# Patient Record
Sex: FEMALE | Race: WHITE | NOT HISPANIC OR LATINO | Employment: OTHER | URBAN - METROPOLITAN AREA
[De-identification: names, ages, dates, MRNs, and addresses within clinical notes are randomized per-mention and may not be internally consistent; named-entity substitution may affect disease eponyms.]

---

## 2018-10-02 ENCOUNTER — TELEPHONE (OUTPATIENT)
Dept: PAIN MEDICINE | Facility: CLINIC | Age: 58
End: 2018-10-02

## 2018-10-02 NOTE — TELEPHONE ENCOUNTER
Left message for Kerline, informed her that we do not currently accept patient's insurance (Medicaid).      ----- Message from Sita Soto sent at 10/2/2018  4:03 PM CDT -----  Contact: Kerline [facility]  Calling to check the status of the referral that was faxed 9/25/18. Please call [phone]

## 2018-10-22 ENCOUNTER — CLINICAL SUPPORT (OUTPATIENT)
Dept: REHABILITATION | Facility: HOSPITAL | Age: 58
End: 2018-10-22
Payer: MEDICAID

## 2018-10-22 DIAGNOSIS — M54.9 BACK PAIN: Primary | ICD-10-CM

## 2018-10-22 DIAGNOSIS — M62.81 MUSCLE WEAKNESS (GENERALIZED): ICD-10-CM

## 2018-10-22 DIAGNOSIS — M54.30 SCIATICA: ICD-10-CM

## 2018-10-22 PROCEDURE — 97161 PT EVAL LOW COMPLEX 20 MIN: CPT

## 2018-10-22 NOTE — PLAN OF CARE
Physical Therapy Evaluation / Plan of Care    Name: Karis SANDHU [facility]  Clinic Number: [id number]    Diagnosis:   Encounter Diagnosis   Name Primary?    Muscle weakness (generalized)      Physician: Rehan Whitaker MD  Treatment Orders: PT Eval and Treat    No past medical history on file.  No current outpatient medications on file.     No current facility-administered medications for this visit.      Review of patient's allergies indicates:  Allergies not on file  Precautions: recent chemo and radiation secondary to breast cancer    Evaluation Date: 10/22/18  Visit # authorized: 8  Authorization period: n/a  Plan of care Expiration: 01/17/19    Subjective   Onset/ANGLELA: gradual and chronic  Onset Date: chronic  Prior Level of Function: independent ADL's and IADL's  Current level of Function: pain limiting ADL's and functional mobility  Social History: patient lives alone in a RV park with a few steep steps to enter trailer, no handrail  Med History: breast cancer s/p right lumpectomy followed by radiation and chemo, currently smokes a pack of cigarettes per day, 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 female that presents to [facility] secondary to low back and right hip pain. Karis states she has had back pain for over 10 years, insidious onset for which she has had no previous medical treatment for. She has recently completed chemo and radiation treatments following diagnosis of breast cancer. During this time her oncologist referred her to neurosurgery as well as to pain management. She is, in fact, scheduled for a pain management procedure with Dr Graham this week but she is unsure of the specifics of planned procedure.      Imaging:  MRI taken but results not available in Epic.    Pain: current 7/10, worst 10/10, best 7/10, Sharp and Shooting  right low back into ant thigh to knee; dull aching in mid back, constant  Radicular symptoms: RLE, ant thigh to knee  Aggravating factors: standing, walking  Easing factors: ice helps temporarily, rest    Pts goals: get back to working full time    No cultural, environmental, or spiritual barriers identified to treatment or learning.  Objective   Observation: Patient is a well developed, well nourished female in NAD.  Posture:  Forward head, rounded shoulders, increased kyphosis, decreased lumbar lordosis    Lumbar Range of Motion:    Movement Loss Pain   Flexion Minimal    Mid-end range pain        Extension Moderate    End range discomfort        Left Side Bending Minimal Increase pain        Right Side Bending Minimal  End range discomfort        Left rotation   Minimal  No change        Right Rotation   Minimal  No change           Lower Extremity Strength  Right LE  Left LE    Knee extension: 4/5 Knee extension: 4+/5   Knee flexion: 4-/5 Knee flexion: 4+/5   Hip flexion: 4-/5 Hip flexion: 4/5   Hip extension:  4-/5 Hip extension: 4-/5   Hip abduction: 4-/5 Hip abduction: 4/5   Hip adduction: 4+/5 Hip adduction 5/5   Ankle dorsiflexion: 4+/5 Ankle dorsiflexion: 5/5   Ankle plantarflexion: 5/5 Ankle plantarflexion: 5/5     Special Tests:  -Repeated Flexion: neg  -Repeated Ext: neg  -Piriformis Test: neg  -Fabers: neg  -Bridge Test: pos  DTR:   Right Left   Patellar (L3-4) 2+ 2+   Achilles (S1) 0 0   Neuro Dynamic Testing:    Sciatic nerve:      SLR: R = pos supine and seated     L = neg    Joint Mobility: decreased segmental mobility of lumbar spine, decrease ROM right hip  Palpation: no increased ttp noted in paraspinals, lumbar spinous processes, mild ttp right sciatic notch  Sensation: gross sensation intact    Flexibility: Ely's test: R = 120 degrees ; L = 110 degrees          Popliteal Angle: R = 50 degrees ; L = 50 degrees    Mod Oswestry: 62% impairment    Evaluation Completed: Yes  Discussed Plan of Care  with patient: Yes    TREATMENT:  Home Exercises and Patient Education Provided  Education provided re: postural care/correction, correct sitting and sleeping positions, role of exercise in treatment of chronic pain   - progress towards goals   - role of therapy in multi - disciplinary team, goals for therapy  No spiritual or educational barriers to learning provided    Written Home Exercises Provided: No  Assessment   This is a 58 y.o. female referred to outpatient physical therapy and presents with a medical diagnosis of back pain, sciatica and demonstrates limitations as described in the problem list. Pt will benefit from physcial therapy services in order to maximize pain free functional independence. The following goals were discussed with the patient and patient is in agreement with them as to be addressed in the treatment plan.    Anticipated barriers to physical therapy: none identified  Medical necessity is demonstrated by the following IMPAIRMENTS/PROBLEM LIST:   1)Increase in pain level limiting function   2)muscle weakness   3)decreased AROM lumbar spine and R hip   4)decreased   5)Lack of HEP    GOALS: Short Term Goals:  4 weeks  1. Report decreased low back/RLE pain  <   / =  5 /10 at max to increase tolerance for ADL's and functional activities.  2. Pt to increase B popliteal angle by > or = 10 degrees in order to improve flexibility and posture.   3. Increased MMT for core/LE's to increase tolerance for ADL and work activities.  4. Pt to increase B Ely's Test by > or = 10 degrees in order to improve flexibility and posture.   5. Pt to tolerate HEP to improve ROM and independence with ADL's    Long Term Goals: 8 weeks  1. Report decreased low back/RLE pain  <   / =  2.5 /10  to increase tolerance for ADL's and functional activities.  2. Pt to increase B popliteal angle by > or = 20 degrees in order to improve flexibility and posture.   3. Increased MMT for core/LE's to increase tolerance for ADL and  work activities.  4. Pt will report decreased disability score on Mod Oswestry for low back pain from 62% < / = 50% impairment to demonstrate decrease in disability and improvement in back pain.  5. Pt to be Independent with HEP to improve ROM and independence with ADL's  6. Pt to increase B Ely's Test by > or = 20 degrees in order to improve flexibility and  posture.   7. Pt to demonstrate negative Bridge Test in order to show improved core strength for lumbar stabilization.     Plan   Pt will be treated by physical therapy 2 times a week for 8 weeks for Pt Education, HEP, therapeutic exercises, neuromuscular re-education, joint mobilizations, modalities prn to achieve established goals. Karis may at times be seen by a PTA as part of the Rehab Team.     Cont PT for 8 weeks.     Doug Melo, PT    I certify the need for these services furnished under this plan of treatment and while under my care.______________________________ Physician/Referring Practitioner  Date of Signature

## 2018-11-05 ENCOUNTER — CLINICAL SUPPORT (OUTPATIENT)
Dept: REHABILITATION | Facility: HOSPITAL | Age: 58
End: 2018-11-05
Payer: MEDICAID

## 2018-11-05 DIAGNOSIS — M62.81 MUSCLE WEAKNESS (GENERALIZED): ICD-10-CM

## 2018-11-05 PROCEDURE — 97012 MECHANICAL TRACTION THERAPY: CPT

## 2018-11-05 PROCEDURE — 97110 THERAPEUTIC EXERCISES: CPT

## 2018-11-05 PROCEDURE — 97010 HOT OR COLD PACKS THERAPY: CPT

## 2018-11-05 NOTE — PROGRESS NOTES
"                            Physical Therapy Daily Treatment Note   Name: Delia Suarez 1960  MRN: [id number]    Visit Date: 11/5/2018  Visit #: 1 / 8  Authorization period Expiration: n/a    Plan of Care Expiration: 01/17/19  Precautions: h/o recent breast cancer    Time In: 9:05  Time Out: 10:20  Total 1:1 Treatment Time: 50 min    Treatment Diagnosis:   Encounter Diagnosis   Name Primary?    Muscle weakness (generalized)      Physician: Rehan Whitaker MD    Subjective   Pt reports: She did some gardening yesterday and is having increased pain today.      Pain Scale:  8-9/10 on VAS currently, 6/10 on VAS post treatment  Pain Location: right hip and low back    Objective   DELIA received therapeutic exercises to develop strength, endurance, ROM, flexibility, posture and core stabilization for 30 minutes including:  Nustep Lv 1 x 12 minutes  Pelvic tilt x 5  SKC x 5  Supine HS stretch with strap with 15" H x 5  DKC on PB x 5  Knee rocks on PB x 5  Hip add with ball x 5    DELIA received the following supervised modalities after being cleared for contradictions: Mechanical Traction:  Delia received static 90/90 traction to the lumbar spine for a total of 15 minutes with moist heat (for 20 minutes). Patient tolerated treatment well without any adverse effects.    Home Exercises and Education Provided     Education provided re: postural care/correction, correct sitting and sleeping positions, role of exercise in treatment of chronic pain   - progress towards goals   - role of therapy in multi - disciplinary team, goals for therapy  No spiritual or educational barriers to learning provided    Written Home Exercises Provided:  Supine HS stretch, SKC, pelvic tilt,  Exercises were reviewed and DELIA was able to demonstrate them prior to the end of the session.   Pt received a written copy of exercises to perform at home. DELIA demonstrated good  understanding of the education provided.     Assessment "   DELIA completed above exercise without complication or increase pain reported. Delia is progressing well towards her goals with no updates to goals at this time.     Pt prognosis is Good. Pt will continue to benefit from skilled outpatient physical therapy to address the deficits listed in the problem list chart on initial evaluation, provide pt/family education and to maximize pt's level of independence in the home and community environment.     Medical necessity is demonstrated by the impairments and functional limitations listed on the Initial Evaluations.     Anticipated barriers to physical therapy: none identified  Pt's spiritual, cultural and educational needs considered and pt agreeable to plan of care and goals.    Goals   Short Term Goals:  4 weeks  1. Report decreased low back/RLE pain  <   / =  5 /10 at max to increase tolerance for ADL's and functional activities.  2. Pt to increase B popliteal angle by > or = 10 degrees in order to improve flexibility and posture.   3. Increased MMT for core/LE's to increase tolerance for ADL and work activities.  4. Pt to increase B Ely's Test by > or = 10 degrees in order to improve flexibility and posture.   5. Pt to tolerate HEP to improve ROM and independence with ADL's     Long Term Goals: 8 weeks  1. Report decreased low back/RLE pain  <   / =  2.5 /10  to increase tolerance for ADL's and functional activities.  2. Pt to increase B popliteal angle by > or = 20 degrees in order to improve flexibility and posture.   3. Increased MMT for core/LE's to increase tolerance for ADL and work activities.  4. Pt will report decreased disability score on Mod Oswestry for low back pain from 62% < / = 50% impairment to demonstrate decrease in disability and improvement in back pain.  5. Pt to be Independent with HEP to improve ROM and independence with ADL's  6. Pt to increase B Ely's Test by > or = 20 degrees in order to improve flexibility and  posture.   7. Pt to  demonstrate negative Bridge Test in order to show improved core strength for lumbar stabilization.     Plan   Continue with established Plan of Care towards Physical Therapy goals.   Discussed Plan of Care with patient: Yes    Doug Melo, PT

## 2018-11-06 ENCOUNTER — CLINICAL SUPPORT (OUTPATIENT)
Dept: REHABILITATION | Facility: HOSPITAL | Age: 58
End: 2018-11-06
Payer: MEDICAID

## 2018-11-06 DIAGNOSIS — M62.81 MUSCLE WEAKNESS (GENERALIZED): ICD-10-CM

## 2018-11-06 PROCEDURE — 97110 THERAPEUTIC EXERCISES: CPT

## 2018-11-06 PROCEDURE — 97010 HOT OR COLD PACKS THERAPY: CPT

## 2018-11-06 PROCEDURE — 97012 MECHANICAL TRACTION THERAPY: CPT

## 2018-11-06 NOTE — PROGRESS NOTES
"                            Physical Therapy Daily Treatment Note   Name: Delia Suarez 1960  MRN: [id number]    Visit Date: 11/6/2018  Visit #: 2 / 8         <<<next MD appt 12/08/18>>>  Authorization period Expiration: n/a    Plan of Care Expiration: 01/17/19  Precautions: h/o recent breast cancer    Time In: 8:10  Time Out: 9:20  Total 1:1 Treatment Time: 50 min    Treatment Diagnosis:   Encounter Diagnosis   Name Primary?    Muscle weakness (generalized)      Physician: Rehan Whitaker MD    Subjective   Pt reports: She did have decrease in pain level after treatment session yesterday but did not last long.     Pain Scale:  7-8/10 on VAS currently, 5 /10 on VAS post treatment  Pain Location: right hip and low back    Objective   DELIA received therapeutic exercises to develop strength, endurance, ROM, flexibility, posture and core stabilization for 35 minutes including:  Nustep Lv 1 x 14 minutes  Pelvic tilt x 5  SKC x 5  Supine HS stretch with strap with 15" H x 5  DKC on PB x 5  Knee rocks on PB x 5  Hip add with ball x 5  Bridging with ball x 5  SLR x 5  SL clams x 5    DELIA received the following supervised modalities after being cleared for contradictions: Mechanical Traction:  Delia received static 90/90 traction to the lumbar spine for a total of 15 minutes with moist heat (for 20 minutes). Patient tolerated treatment well without any adverse effects.    Home Exercises and Education Provided     Education provided re: postural care/correction, correct sitting and sleeping positions, role of exercise in treatment of chronic pain   - progress towards goals   - role of therapy in multi - disciplinary team, goals for therapy  No spiritual or educational barriers to learning provided    Written Home Exercises Provided:  Supine HS stretch, SKC, pelvic tilt,  Exercises were reviewed and DELIA was able to demonstrate them prior to the end of the session.   Pt received a written copy of " exercises to perform at home. DELIA demonstrated good  understanding of the education provided.     Assessment   DELIA completed above exercise without complication or increase pain reported. Delia is progressing well towards her goals with no updates to goals at this time.     Pt prognosis is Good. Pt will continue to benefit from skilled outpatient physical therapy to address the deficits listed in the problem list chart on initial evaluation, provide pt/family education and to maximize pt's level of independence in the home and community environment.     Medical necessity is demonstrated by the impairments and functional limitations listed on the Initial Evaluation.     Anticipated barriers to physical therapy: none identified  Pt's spiritual, cultural and educational needs considered and pt agreeable to plan of care and goals.    Goals   Short Term Goals:  4 weeks  1. Report decreased low back/RLE pain  <   / =  5 /10 at max to increase tolerance for ADL's and functional activities.  2. Pt to increase B popliteal angle by > or = 10 degrees in order to improve flexibility and posture.   3. Increased MMT for core/LE's to increase tolerance for ADL and work activities.  4. Pt to increase B Ely's Test by > or = 10 degrees in order to improve flexibility and posture.   5. Pt to tolerate HEP to improve ROM and independence with ADL's     Long Term Goals: 8 weeks  1. Report decreased low back/RLE pain  <   / =  2.5 /10  to increase tolerance for ADL's and functional activities.  2. Pt to increase B popliteal angle by > or = 20 degrees in order to improve flexibility and posture.   3. Increased MMT for core/LE's to increase tolerance for ADL and work activities.  4. Pt will report decreased disability score on Mod Oswestry for low back pain from 62% < / = 50% impairment to demonstrate decrease in disability and improvement in back pain.  5. Pt to be Independent with HEP to improve ROM and independence with  ADL's  6. Pt to increase B Ely's Test by > or = 20 degrees in order to improve flexibility and  posture.   7. Pt to demonstrate negative Bridge Test in order to show improved core strength for lumbar stabilization.     Plan   Continue with established Plan of Care towards Physical Therapy goals.   Discussed Plan of Care with patient: Yes    Doug Melo, PT

## 2018-11-12 ENCOUNTER — CLINICAL SUPPORT (OUTPATIENT)
Dept: REHABILITATION | Facility: HOSPITAL | Age: 58
End: 2018-11-12
Payer: MEDICAID

## 2018-11-12 DIAGNOSIS — M62.81 MUSCLE WEAKNESS (GENERALIZED): ICD-10-CM

## 2018-11-12 PROCEDURE — 97014 ELECTRIC STIMULATION THERAPY: CPT

## 2018-11-12 NOTE — PROGRESS NOTES
"                            Physical Therapy Daily Treatment Note   Name: Delia Suarez 1960  MRN: [id number]    Visit Date: 11/12/2018  Visit #: 3 / 8         <<<next MD appt 12/08/18>>>  Authorization period Expiration: n/a    Plan of Care Expiration: 01/17/19  Precautions: h/o recent breast cancer    Time In: 9:10  Time Out: 10:20  Total 1:1 Treatment Time: 40 min    Treatment Diagnosis:   Encounter Diagnosis   Name Primary?    Muscle weakness (generalized)      Physician: Rehan Whitaker MD    Subjective   Pt reports: She is having more pain today, 9/10, doesn't remember doing anything to aggravate it. She also reports she still has not heard from new pain management doctor.     Pain Scale:  9 /10 on VAS currently, 9 /10 on VAS post treatment  Pain Location: right hip and low back    Objective   DELIA received therapeutic exercises to develop strength, endurance, ROM, flexibility, posture and core stabilization for 40 minutes including:  Nustep Lv 1 x 14 minutes  Pelvic tilt x 10  SKC x 10  Supine HS stretch with strap with 15" H x 5  DKC on PB x 10  Knee rocks on PB x 10  Hip add with ball x 10  Bridging with ball x 10  SLR x 10  SL clams x 10    DELIA received moist heat to low back for 20 minutes after being cleared for contradictions with no adverse reactions noted.    Home Exercises and Education Provided     Education provided re: postural care/correction, correct sitting and sleeping positions, role of exercise in treatment of chronic pain   - progress towards goals   - role of therapy in multi - disciplinary team, goals for therapy  No spiritual or educational barriers to learning provided    Written Home Exercises Provided:  Supine HS stretch, SKC, pelvic tilt,  Exercises were reviewed and DELIA was able to demonstrate them prior to the end of the session.   Pt received a written copy of exercises to perform at home. DELIA demonstrated good  understanding of the education provided. "     Assessment   DELIA completed above exercise without complication increasing repetitions to 10 as noted above. Discontinued 90/90 traction today as patient does not feel it is beneficial in affecting her pain level. Patient reported no change in pain level at completion of session. Delia is progressing well towards her goals with no updates to goals at this time.     Pt prognosis is Good. Pt will continue to benefit from skilled outpatient physical therapy to address the deficits listed in the problem list chart on initial evaluation, provide pt/family education and to maximize pt's level of independence in the home and community environment.     Medical necessity is demonstrated by the impairments and functional limitations listed on the Initial Evaluation.     Anticipated barriers to physical therapy: none identified  Pt's spiritual, cultural and educational needs considered and pt agreeable to plan of care and goals.    Goals   Short Term Goals:  4 weeks  1. Report decreased low back/RLE pain  <   / =  5 /10 at max to increase tolerance for ADL's and functional activities.  2. Pt to increase B popliteal angle by > or = 10 degrees in order to improve flexibility and posture.   3. Increased MMT for core/LE's to increase tolerance for ADL and work activities.  4. Pt to increase B Ely's Test by > or = 10 degrees in order to improve flexibility and posture.   5. Pt to tolerate HEP to improve ROM and independence with ADL's     Long Term Goals: 8 weeks  1. Report decreased low back/RLE pain  <   / =  2.5 /10  to increase tolerance for ADL's and functional activities.  2. Pt to increase B popliteal angle by > or = 20 degrees in order to improve flexibility and posture.   3. Increased MMT for core/LE's to increase tolerance for ADL and work activities.  4. Pt will report decreased disability score on Mod Oswestry for low back pain from 62% < / = 50% impairment to demonstrate decrease in disability and  improvement in back pain.  5. Pt to be Independent with HEP to improve ROM and independence with ADL's  6. Pt to increase B Ely's Test by > or = 20 degrees in order to improve flexibility and  posture.   7. Pt to demonstrate negative Bridge Test in order to show improved core strength for lumbar stabilization.     Plan   Continue with established Plan of Care towards Physical Therapy goals.   Discussed Plan of Care with patient: Yes    Doug Melo, PT

## 2018-11-13 ENCOUNTER — CLINICAL SUPPORT (OUTPATIENT)
Dept: REHABILITATION | Facility: HOSPITAL | Age: 58
End: 2018-11-13
Payer: MEDICAID

## 2018-11-13 DIAGNOSIS — M62.81 MUSCLE WEAKNESS (GENERALIZED): ICD-10-CM

## 2018-11-13 PROCEDURE — 97110 THERAPEUTIC EXERCISES: CPT

## 2018-11-13 NOTE — PROGRESS NOTES
"                            Physical Therapy Daily Treatment Note   Name: Delia Suarez 1960  MRN: [id number]    Visit Date: 11/13/2018  Visit #: 4 / 8         <<<next MD appt 12/08/18>>>  Authorization period Expiration: n/a    Plan of Care Expiration: 01/17/19  Precautions: h/o recent breast cancer    Time In: 8:10  Time Out: 9:15  Total 1:1 Treatment Time: 40 min    Treatment Diagnosis:   Encounter Diagnosis   Name Primary?    Muscle weakness (generalized)      Physician: Rehan Whitaker MD    Subjective   Pt reports: She had increased pain yesterday in her right hip/LE with difficulty lifting her leg to even drive. She also reports she still has not heard from new pain management doctor.     Pain Scale:  7 /10 on VAS currently, 6-7 /10 on VAS post treatment  Pain Location: right hip and low back    Objective   DELIA received therapeutic exercises to develop strength, endurance, ROM, flexibility, posture and core stabilization for 40 minutes including:  Nustep Lv 1 x 14 minutes  Pelvic tilt x 10  SKC x 10  Supine HS stretch with strap with 15" H x 5  DKC on PB x 10  Knee rocks on PB x 10  Hip add with ball x 10  Bridging with ball x 10  SLR x 10  SL clams x 10    DELIA received moist heat to low back for 20 minutes after being cleared for contradictions with no adverse reactions noted.    Home Exercises and Education Provided     Education provided re: postural care/correction, correct sitting and sleeping positions, role of exercise in treatment of chronic pain   - progress towards goals   - role of therapy in multi - disciplinary team, goals for therapy  No spiritual or educational barriers to learning provided    Written Home Exercises Provided:  Supine HS stretch, SKC, pelvic tilt,  Exercises were reviewed and DELIA was able to demonstrate them prior to the end of the session.   Pt received a written copy of exercises to perform at home. DELIA demonstrated good  understanding of the " education provided.     Assessment   DELIA completed above exercise without complication or increase pain reported. Discontinued 90/90 traction as patient does not feel it is beneficial in affecting her pain level. Patient reported no significant change in pain level at completion of session. Delia is progressing well towards her goals with no updates to goals at this time.     Pt prognosis is Good. Pt will continue to benefit from skilled outpatient physical therapy to address the deficits listed in the problem list chart on initial evaluation, provide pt/family education and to maximize pt's level of independence in the home and community environment.     Medical necessity is demonstrated by the impairments and functional limitations listed on the Initial Evaluation.     Anticipated barriers to physical therapy: none identified  Pt's spiritual, cultural and educational needs considered and pt agreeable to plan of care and goals.    Goals   Short Term Goals:  4 weeks  1. Report decreased low back/RLE pain  <   / =  5 /10 at max to increase tolerance for ADL's and functional activities.  2. Pt to increase B popliteal angle by > or = 10 degrees in order to improve flexibility and posture.   3. Increased MMT for core/LE's to increase tolerance for ADL and work activities.  4. Pt to increase B Ely's Test by > or = 10 degrees in order to improve flexibility and posture.   5. Pt to tolerate HEP to improve ROM and independence with ADL's     Long Term Goals: 8 weeks  1. Report decreased low back/RLE pain  <   / =  2.5 /10  to increase tolerance for ADL's and functional activities.  2. Pt to increase B popliteal angle by > or = 20 degrees in order to improve flexibility and posture.   3. Increased MMT for core/LE's to increase tolerance for ADL and work activities.  4. Pt will report decreased disability score on Mod Oswestry for low back pain from 62% < / = 50% impairment to demonstrate decrease in disability and  improvement in back pain.  5. Pt to be Independent with HEP to improve ROM and independence with ADL's  6. Pt to increase B Ely's Test by > or = 20 degrees in order to improve flexibility and  posture.   7. Pt to demonstrate negative Bridge Test in order to show improved core strength for lumbar stabilization.     Plan   Continue with established Plan of Care towards Physical Therapy goals.   Discussed Plan of Care with patient: Yes    Doug Melo, PT

## 2018-11-20 ENCOUNTER — CLINICAL SUPPORT (OUTPATIENT)
Dept: REHABILITATION | Facility: HOSPITAL | Age: 58
End: 2018-11-20
Payer: MEDICAID

## 2018-11-20 DIAGNOSIS — M62.81 MUSCLE WEAKNESS (GENERALIZED): ICD-10-CM

## 2018-11-20 PROCEDURE — 97110 THERAPEUTIC EXERCISES: CPT

## 2018-11-20 NOTE — PROGRESS NOTES
"                            Physical Therapy Daily Treatment Note   Name: Delia Suarez 1960  MRN: [id number]    Visit Date: 11/20/2018  Visit #: 5 / 8         <<<next MD appt 12/08/18>>>  Authorization period Expiration: n/a    Plan of Care Expiration: 01/17/19  Precautions: h/o recent breast cancer    Time In: 8:10  Time Out: 9:15  Total 1:1 Treatment Time: 40 min    Treatment Diagnosis:   Encounter Diagnosis   Name Primary?    Muscle weakness (generalized)      Physician: Rehan Whitaker MD    Subjective   Pt reports: My butt my back and down my leg on R side. It's been like this for weeks, I don't think this helps but this is what they want me to do.      Pain Scale:  7 /10 on VAS currently, 6-7 /10 on VAS post treatment  Pain Location: right hip and low back    Objective   DELIA received therapeutic exercises to develop strength, endurance, ROM, flexibility, posture and core stabilization for 40 minutes including:  Nustep Lv 1 x 14 minutes  Pelvic tilt x 10  SKC x 10  Supine HS stretch with strap with 15" H x 5  DKC on PB x 10  Knee rocks on PB x 10  Hip add with ball x 10  Bridging with ball x 10  SLR x 10  SL clams x 10    DELIA received moist heat to low back for 20 minutes after being cleared for contradictions with no adverse reactions noted.    Home Exercises and Education Provided     Education provided re: postural care/correction, correct sitting and sleeping positions, role of exercise in treatment of chronic pain   - progress towards goals   - role of therapy in multi - disciplinary team, goals for therapy  No spiritual or educational barriers to learning provided    Written Home Exercises Provided:  Supine HS stretch, SKC, pelvic tilt,  Exercises were reviewed and DELIA was able to demonstrate them prior to the end of the session.   Pt received a written copy of exercises to perform at home. DELIA demonstrated good  understanding of the education provided.     Assessment   DELIA " completed above exercise without complication or increase pain reported. Patient reported no significant change in pain level at completion of session. Karis is progressing well towards her goals with no updates to goals at this time. Pt states most movement bothers her back, no     Pt prognosis is Good. Pt will continue to benefit from skilled outpatient physical therapy to address the deficits listed in the problem list chart on initial evaluation, provide pt/family education and to maximize pt's level of independence in the home and community environment.     Medical necessity is demonstrated by the impairments and functional limitations listed on the Initial Evaluation.     Anticipated barriers to physical therapy: none identified  Pt's spiritual, cultural and educational needs considered and pt agreeable to plan of care and goals.    Goals   Short Term Goals:  4 weeks  1. Report decreased low back/RLE pain  <   / =  5 /10 at max to increase tolerance for ADL's and functional activities.  2. Pt to increase B popliteal angle by > or = 10 degrees in order to improve flexibility and posture.   3. Increased MMT for core/LE's to increase tolerance for ADL and work activities.  4. Pt to increase B Ely's Test by > or = 10 degrees in order to improve flexibility and posture.   5. Pt to tolerate HEP to improve ROM and independence with ADL's     Long Term Goals: 8 weeks  1. Report decreased low back/RLE pain  <   / =  2.5 /10  to increase tolerance for ADL's and functional activities.  2. Pt to increase B popliteal angle by > or = 20 degrees in order to improve flexibility and posture.   3. Increased MMT for core/LE's to increase tolerance for ADL and work activities.  4. Pt will report decreased disability score on Mod Oswestry for low back pain from 62% < / = 50% impairment to demonstrate decrease in disability and improvement in back pain.  5. Pt to be Independent with HEP to improve ROM and independence with  ADL's  6. Pt to increase B Ely's Test by > or = 20 degrees in order to improve flexibility and  posture.   7. Pt to demonstrate negative Bridge Test in order to show improved core strength for lumbar stabilization.     Plan   Continue with established Plan of Care towards Physical Therapy goals.   Discussed Plan of Care with patient: Yes    Balaji Mariee, PTA

## 2018-11-26 ENCOUNTER — CLINICAL SUPPORT (OUTPATIENT)
Dept: REHABILITATION | Facility: HOSPITAL | Age: 58
End: 2018-11-26
Payer: MEDICAID

## 2018-11-26 DIAGNOSIS — M62.81 MUSCLE WEAKNESS (GENERALIZED): ICD-10-CM

## 2018-11-26 PROCEDURE — 97110 THERAPEUTIC EXERCISES: CPT

## 2018-11-26 NOTE — PROGRESS NOTES
"                            Physical Therapy Daily Treatment Note   Name: Delia Suarez 1960  MRN: [id number]    Visit Date: 11/26/2018  Visit #: 6 / 8         <<<next MD appt 12/04/18>>>  Authorization period Expiration: n/a    Plan of Care Expiration: 01/17/19  Precautions: h/o recent breast cancer    Time In: 11:00  Time Out: 12:05  Total 1:1 Treatment Time: 40 min    Treatment Diagnosis:   Encounter Diagnosis   Name Primary?    Muscle weakness (generalized)      Physician: Rehan Whitaker MD    Subjective   Pt reports: She only worked 1 day last week and by the end of her shift she having so much pain she was dragging her leg. "I don't think therapy is helping.....I don't do any exercise at home because of my pain."     Pain Scale:  6 /10 on VAS currently, 6 /10 on VAS post treatment  Pain Location: right hip and low back    Objective   DELIA received therapeutic exercises to develop strength, endurance, ROM, flexibility, posture and core stabilization for 40 minutes including:  Nustep Lv 2 x 8 minutes, Lv 1 x 6 minutes  Pelvic tilt x 10  SKC x 10  Supine HS stretch with strap with 15" H x 5  DKC on PB x 10  Knee rocks on PB x 10  Hip add with ball x 10  Bridging with ball x 10  SLR x 10  SL clams with RTB x 10    DELIA received moist heat to low back for 15 minutes after being cleared for contradictions with no adverse reactions noted.    Popliteal angle: R= 30 degrees, L= 40 degrees  ELY's: R= 120 degrees, L= 128 degrees  MMT: no significant change in core or LE motor strength noted since tested at initial eval  Home Exercises and Education Provided     Education provided re: postural care/correction, correct sitting and sleeping positions, role of exercise in treatment of chronic pain   - progress towards goals   - role of therapy in multi - disciplinary team, goals for therapy  No spiritual or educational barriers to learning provided    Written Home Exercises Provided:  Supine HS stretch, SKC, " pelvic tilt, trunk and LE flexibility and strengthening exercises  Exercises were reviewed and DELIA was able to demonstrate them prior to the end of the session.   Pt received a written copy of exercises to perform at home. DELIA demonstrated good  understanding of the education provided.     Assessment   DELIA completed above exercise without complication or increase pain reported. Able to add minimal resistance to select activities as noted above. Patient reported no change in pain level at completion of session. Sachi is making minimal progress towards her goals as noted below. Goals remain relevant with no changes at this time.    Pt prognosis is Good. Pt will continue to benefit from skilled outpatient physical therapy to address the deficits listed in the problem list chart on initial evaluation, provide pt/family education and to maximize pt's level of independence in the home and community environment.     Medical necessity is demonstrated by the impairments and functional limitations listed on the Initial Evaluation.     Anticipated barriers to physical therapy: none identified  Pt's spiritual, cultural and educational needs considered and pt agreeable to plan of care and goals.    Goals   Short Term Goals:  4 weeks  1. Report decreased low back/RLE pain  <   / =  5 /10 at max to increase tolerance for ADL's and functional activities.  Goal not met (11/26/18)  2. Pt to increase B popliteal angle by > or = 10 degrees in order to improve flexibility and posture.    Goal met (11/26/18)  3. Increased MMT for core/LE's to increase tolerance for ADL and work activities.   Goal not met (11/26/18)  4. Pt to increase B Ely's Test by > or = 10 degrees in order to improve flexibility and posture.   Goal partially met (L side only- 11/26/18)  5. Pt to tolerate HEP to improve ROM and independence with ADL's   Goal not met     Long Term Goals: 8 weeks  1. Report decreased low back/RLE pain  <   / =  2.5 /10   to increase tolerance for ADL's and functional activities.  2. Pt to increase B popliteal angle by > or = 20 degrees in order to improve flexibility and posture.   3. Increased MMT for core/LE's to increase tolerance for ADL and work activities.  4. Pt will report decreased disability score on Mod Oswestry for low back pain from 62% < / = 50% impairment to demonstrate decrease in disability and improvement in back pain.  5. Pt to be Independent with HEP to improve ROM and independence with ADL's  6. Pt to increase B Ely's Test by > or = 20 degrees in order to improve flexibility and  posture.   7. Pt to demonstrate negative Bridge Test in order to show improved core strength for lumbar stabilization.     Plan   Continue with established Plan of Care towards Physical Therapy goals.   Discussed Plan of Care with patient: Yes    Doug Melo, PT

## 2018-11-27 ENCOUNTER — CLINICAL SUPPORT (OUTPATIENT)
Dept: REHABILITATION | Facility: HOSPITAL | Age: 58
End: 2018-11-27
Payer: MEDICAID

## 2018-11-27 DIAGNOSIS — M62.81 MUSCLE WEAKNESS (GENERALIZED): ICD-10-CM

## 2018-11-27 PROCEDURE — 97110 THERAPEUTIC EXERCISES: CPT

## 2018-11-27 PROCEDURE — 97010 HOT OR COLD PACKS THERAPY: CPT

## 2018-11-27 NOTE — PROGRESS NOTES
"                            Physical Therapy Daily Treatment Note   Name: Delia Suarez 1960  MRN: [id number]    Visit Date: 11/27/2018  Visit #: 7 / 8         <<<next MD appt 12/04/18>>>  Authorization period Expiration: n/a    Plan of Care Expiration: 01/17/19  Precautions: h/o recent breast cancer    Time In: 8:00  Time Out: 9:10  Total 1:1 Treatment Time: 40 min    Treatment Diagnosis:   Encounter Diagnosis   Name Primary?    Muscle weakness (generalized)      Physician: Rehan Whitaker MD    Subjective   Pt reports: She has aching and weakness in her right leg when she initially gets up and sharp shooting pain with extended standing activities. " I don't do any exercise at home because of my pain."     Pain Scale:  6 /10 on VAS currently, 6 /10 on VAS post treatment  Pain Location: right hip and low back    Objective   DELIA received therapeutic exercises to develop strength, endurance, ROM, flexibility, posture and core stabilization for 40 minutes including:  Nustep Lv 1 x 19 minutes  Pelvic tilt x 10  SKC x 10  Supine HS stretch with strap with 15" H x 5  DKC on PB x 10  Knee rocks on PB x 10  Hip add with ball x 10  Bridging with PB x 10  SLR x 10  SL clams with RTB x 10    DELIA received moist heat to low back for 15 minutes after being cleared for contradictions with no adverse reactions noted.    Measured on 11/26/18:  Popliteal angle: R= 30 degrees, L= 40 degrees  ELY's: R= 120 degrees, L= 128 degrees  MMT: no significant change in core or LE motor strength noted since tested at initial eval  Home Exercises and Education Provided     Education provided re: postural care/correction, correct sitting and sleeping positions, role of exercise in treatment of chronic pain   - progress towards goals   - role of therapy in multi - disciplinary team, goals for therapy  No spiritual or educational barriers to learning provided    Written Home Exercises Provided:  Supine HS stretch, SKC, pelvic tilt, " trunk and LE flexibility and strengthening exercises  Exercises were reviewed and DELIA was able to demonstrate them prior to the end of the session.   Pt received a written copy of exercises to perform at home. DELIA demonstrated good  understanding of the education provided.     Assessment   DELIA completed above exercise without complication or increase pain reported, and no change in pain level at completion of session. Sachi is making minimal progress towards her goals as noted below. Goals remain relevant with no changes at this time.    Pt prognosis is Good. Pt will continue to benefit from skilled outpatient physical therapy to address the deficits listed in the problem list chart on initial evaluation, provide pt/family education and to maximize pt's level of independence in the home and community environment.     Medical necessity is demonstrated by the impairments and functional limitations listed on the Initial Evaluation.     Anticipated barriers to physical therapy: none identified  Pt's spiritual, cultural and educational needs considered and pt agreeable to plan of care and goals.    Goals   Short Term Goals:  4 weeks  1. Report decreased low back/RLE pain  <   / =  5 /10 at max to increase tolerance for ADL's and functional activities.  Goal not met (11/26/18)  2. Pt to increase B popliteal angle by > or = 10 degrees in order to improve flexibility and posture.    Goal met (11/26/18)  3. Increased MMT for core/LE's to increase tolerance for ADL and work activities.   Goal not met (11/26/18)  4. Pt to increase B Ely's Test by > or = 10 degrees in order to improve flexibility and posture.   Goal partially met (L side only- 11/26/18)  5. Pt to tolerate HEP to improve ROM and independence with ADL's   Goal not met     Long Term Goals: 8 weeks  1. Report decreased low back/RLE pain  <   / =  2.5 /10  to increase tolerance for ADL's and functional activities.  2. Pt to increase B popliteal  angle by > or = 20 degrees in order to improve flexibility and posture.   3. Increased MMT for core/LE's to increase tolerance for ADL and work activities.  4. Pt will report decreased disability score on Mod Oswestry for low back pain from 62% < / = 50% impairment to demonstrate decrease in disability and improvement in back pain.  5. Pt to be Independent with HEP to improve ROM and independence with ADL's  6. Pt to increase B Ely's Test by > or = 20 degrees in order to improve flexibility and  posture.   7. Pt to demonstrate negative Bridge Test in order to show improved core strength for lumbar stabilization.     Plan   Continue with established Plan of Care towards Physical Therapy goals.   Discussed Plan of Care with patient: Yes    Doug Melo, PT

## 2018-12-03 ENCOUNTER — CLINICAL SUPPORT (OUTPATIENT)
Dept: REHABILITATION | Facility: HOSPITAL | Age: 58
End: 2018-12-03
Payer: MEDICAID

## 2018-12-03 DIAGNOSIS — M62.81 MUSCLE WEAKNESS (GENERALIZED): ICD-10-CM

## 2018-12-03 PROCEDURE — 97110 THERAPEUTIC EXERCISES: CPT

## 2018-12-03 NOTE — PROGRESS NOTES
"                            Physical Therapy Daily Treatment Note   Name: Delia Suarez 1960  MRN: [id number]    Visit Date: 12/3/2018  Visit #: 8 / 8         <<<next MD appt 12/04/18>>>  Authorization period Expiration: n/a    Plan of Care Expiration: 01/17/19  Precautions: h/o recent breast cancer    Time In: 8:00  Time Out: 9:10  Total 1:1 Treatment Time: 40 min    Treatment Diagnosis:   Encounter Diagnosis   Name Primary?    Muscle weakness (generalized)      Physician: Rehan Whitaker MD    Subjective   Pt reports: had a rough night, I tossed and turned all night, pain in low back that constantly goes into my R leg down to the knee, hasn't gotten any better.      Pain Scale:  9 /10 on VAS currently, 9 /10 on VAS post treatment  Pain Location: right hip and low back    Objective   DELAI received therapeutic exercises to develop strength, endurance, ROM, flexibility, posture and core stabilization for 40 minutes including:  Nustep Lv 1 x 19 minutes  Pelvic tilt x 10  SKC x 10  Supine HS stretch with strap with 15" H x 5  DKC on PB x 10  Knee rocks on PB x 10  Hip add with ball x 10  Bridging with PB x 10  SLR x 10  SL clams with RTB x 10  Seated lumbar flex with PB: x 10 5sec hold    DELIA received moist heat to low back for 15 minutes after being cleared for contradictions with no adverse reactions noted.    Measured on 11/26/18:  Popliteal angle: R= 30 degrees, L= 40 degrees  ELY's: R= 120 degrees, L= 128 degrees  MMT: no significant change in core or LE motor strength noted since tested at initial eval  Home Exercises and Education Provided     Education provided re: postural care/correction, correct sitting and sleeping positions, role of exercise in treatment of chronic pain   - progress towards goals   - role of therapy in multi - disciplinary team, goals for therapy  No spiritual or educational barriers to learning provided    Written Home Exercises Provided:  Supine HS stretch, SKC, pelvic " tilt, trunk and LE flexibility and strengthening exercises  Exercises were reviewed and DELIA was able to demonstrate them prior to the end of the session.   Pt received a written copy of exercises to perform at home. DELIA demonstrated good  understanding of the education provided.     Assessment   DELIA completed above exercise stating the symptoms never worsened however pain level remained same until departure. Pt to return to MD tomorrow for follow up. To hold PT until further MD orders. Symptoms present with low back pain that constantly refer into R LE. Radicular symptoms ease with sidelying and sitting.     Pt prognosis is Good. Pt will continue to benefit from skilled outpatient physical therapy to address the deficits listed in the problem list chart on initial evaluation, provide pt/family education and to maximize pt's level of independence in the home and community environment.     Medical necessity is demonstrated by the impairments and functional limitations listed on the Initial Evaluation.     Anticipated barriers to physical therapy: none identified  Pt's spiritual, cultural and educational needs considered and pt agreeable to plan of care and goals.    Goals   Short Term Goals:  4 weeks  1. Report decreased low back/RLE pain  <   / =  5 /10 at max to increase tolerance for ADL's and functional activities.  Goal not met (11/26/18)  2. Pt to increase B popliteal angle by > or = 10 degrees in order to improve flexibility and posture.    Goal met (11/26/18)  3. Increased MMT for core/LE's to increase tolerance for ADL and work activities.   Goal not met (11/26/18)  4. Pt to increase B Ely's Test by > or = 10 degrees in order to improve flexibility and posture.   Goal partially met (L side only- 11/26/18)  5. Pt to tolerate HEP to improve ROM and independence with ADL's   Goal not met     Long Term Goals: 8 weeks  1. Report decreased low back/RLE pain  <   / =  2.5 /10  to increase tolerance  for ADL's and functional activities.  2. Pt to increase B popliteal angle by > or = 20 degrees in order to improve flexibility and posture.   3. Increased MMT for core/LE's to increase tolerance for ADL and work activities.  4. Pt will report decreased disability score on Mod Oswestry for low back pain from 62% < / = 50% impairment to demonstrate decrease in disability and improvement in back pain.  5. Pt to be Independent with HEP to improve ROM and independence with ADL's  6. Pt to increase B Ely's Test by > or = 20 degrees in order to improve flexibility and  posture.   7. Pt to demonstrate negative Bridge Test in order to show improved core strength for lumbar stabilization.     Plan   Hold PT until further MD orders  Discussed Plan of Care with patient: Yes    Balaji Mariee, PTA

## 2018-12-15 ENCOUNTER — HOSPITAL ENCOUNTER (EMERGENCY)
Facility: HOSPITAL | Age: 58
Discharge: HOME | End: 2018-12-15
Attending: INTERNAL MEDICINE
Payer: MEDICAID

## 2018-12-15 VITALS
WEIGHT: 115 LBS | HEIGHT: 66 IN | SYSTOLIC BLOOD PRESSURE: 126 MMHG | RESPIRATION RATE: 18 BRPM | HEART RATE: 93 BPM | OXYGEN SATURATION: 99 % | BODY MASS INDEX: 18.48 KG/M2 | TEMPERATURE: 99 F | DIASTOLIC BLOOD PRESSURE: 99 MMHG

## 2018-12-15 DIAGNOSIS — M54.41 CHRONIC RIGHT-SIDED LOW BACK PAIN WITH RIGHT-SIDED SCIATICA: Primary | ICD-10-CM

## 2018-12-15 DIAGNOSIS — G89.29 CHRONIC RIGHT-SIDED LOW BACK PAIN WITH RIGHT-SIDED SCIATICA: Primary | ICD-10-CM

## 2018-12-15 DIAGNOSIS — M54.31 SCIATICA OF RIGHT SIDE: ICD-10-CM

## 2018-12-15 PROBLEM — E78.2 MIXED HYPERLIPIDEMIA: Status: ACTIVE | Noted: 2018-12-15

## 2018-12-15 PROBLEM — Z12.31 ENCOUNTER FOR SCREENING MAMMOGRAM FOR MALIGNANT NEOPLASM OF BREAST: Status: ACTIVE | Noted: 2018-12-15

## 2018-12-15 PROBLEM — C50.911 CANCER OF RIGHT FEMALE BREAST: Status: ACTIVE | Noted: 2018-12-15

## 2018-12-15 PROBLEM — Z72.0 TOBACCO USE: Status: ACTIVE | Noted: 2018-12-15

## 2018-12-15 PROBLEM — F32.A DEPRESSION: Status: ACTIVE | Noted: 2018-12-15

## 2018-12-15 PROCEDURE — 63600175 PHARM REV CODE 636 W HCPCS: Performed by: NURSE PRACTITIONER

## 2018-12-15 PROCEDURE — 96372 THER/PROPH/DIAG INJ SC/IM: CPT

## 2018-12-15 PROCEDURE — 99284 EMERGENCY DEPT VISIT MOD MDM: CPT | Mod: 25

## 2018-12-15 RX ORDER — LETROZOLE 2.5 MG/1
2.5 TABLET, FILM COATED ORAL DAILY
COMMUNITY

## 2018-12-15 RX ORDER — HYDROCODONE BITARTRATE AND ACETAMINOPHEN 10; 325 MG/1; MG/1
1 TABLET ORAL EVERY 8 HOURS PRN
Qty: 10 TABLET | Refills: 0 | Status: SHIPPED | OUTPATIENT
Start: 2018-12-15 | End: 2019-08-28

## 2018-12-15 RX ORDER — KETOROLAC TROMETHAMINE 30 MG/ML
60 INJECTION, SOLUTION INTRAMUSCULAR; INTRAVENOUS
Status: COMPLETED | OUTPATIENT
Start: 2018-12-15 | End: 2018-12-15

## 2018-12-15 RX ORDER — METHYLPREDNISOLONE 4 MG/1
TABLET ORAL
Qty: 1 PACKAGE | Refills: 0 | Status: SHIPPED | OUTPATIENT
Start: 2018-12-15 | End: 2019-01-05

## 2018-12-15 RX ADMIN — KETOROLAC TROMETHAMINE 60 MG: 30 INJECTION, SOLUTION INTRAMUSCULAR at 12:12

## 2018-12-15 NOTE — ED PROVIDER NOTES
"CHIEF COMPLAINT  Chief Complaint   Patient presents with    Back Pain     Onset "years" ago. Describes pain to become progressively worse until becoming unbearable x 2-3 days ago. Describes pain to radiate into the right hip and down into the right leg. Describes pain to be constant and sharp/shooting in nature.    Hip Pain    Leg Pain       HPI  Karis Hearn a 58 y.o. female who presents to the ED with complaints of "sciatic" pain. States has chronic back pain but today pain is unbearable. Pain is 10/10 today.  [facility] PCP is trying to find her a pain management Dr.        CURRENT MEDICATIONS  No current facility-administered medications on file prior to encounter.      Current Outpatient Medications on File Prior to Encounter   Medication Sig Dispense Refill    GABAPENTIN ORAL Take by mouth.      letrozole (FEMARA) 2.5 mg Tab Take 2.5 mg by mouth once daily.      pravastatin sodium (PRAVASTATIN ORAL) Take by mouth.         ALLERGIES  Review of patient's allergies indicates:  Allergies not on file    Immunization History   Administered Date(s) Administered    Tdap 01/24/2018       PAST MEDICAL HISTORY  Past Medical History:   Diagnosis Date    AVN (avascular necrosis of bone)     Blood clot in vein     Breast cancer     Hypercholesteremia     Pulmonary embolism        SURGICAL HISTORY  Past Surgical History:   Procedure Laterality Date    FOOT SURGERY Left        SOCIAL HISTORY  Social History     Socioeconomic History    Marital status:      Spouse name: Not on file    Number of children: Not on file    Years of education: Not on file    Highest education level: Not on file   Social Needs    Financial resource strain: Not on file    Food insecurity - worry: Not on file    Food insecurity - inability: Not on file    Transportation needs - medical: Not on file    Transportation needs - non-medical: Not on file   Occupational History    Not on file   Tobacco Use    Smoking status: " "Current Every Day Smoker     Packs/day: 0.50     Types: Cigarettes    Smokeless tobacco: Never Used   Substance and Sexual Activity    Alcohol use: Yes     Comment: Occasional    Drug use: No    Sexual activity: Not on file   Other Topics Concern    Not on file   Social History Narrative    Not on file       FAMILY HISTORY  History reviewed. No pertinent family history.    REVIEW OF SYSTEMS  Constitutional: No fever, chills, or weakness.  Eyes: No redness, pain, or discharge  HENT: No ear pain, no headache, no rhinorrhea, no throat pain  Respiratory: No cough, wheezing or shortness of breath  Cardiovascular: No chest pain, palpitations or edema  GI: No abdominal pain, nausea, vomiting or diarrhea  Gu: No dysuria, no hematuria, or discharge  Musculoskeletal: Right lower back/hip pain radiating to knee  Skin: No rash or abrasion  Neurologic: No focal weakness or sensory changes.  All systems otherwise negative except as noted in the Review of Systems and History of Present Illness      PHYSICAL EXAM  Reviewed Triage Note  VITAL SIGNS:   Patient Vitals for the past 24 hrs:   BP Temp Temp src Pulse Resp SpO2 Height Weight   12/15/18 1201 (!) 126/99 98.7 °F (37.1 °C) Oral 93 18 99 % 5' 6" (1.676 m) 52.2 kg (115 lb)     Constitutional: Well developed, well nourished, Alert and oriented x3, No acute distress, non-toxic appearance.  HENT: Normocephalic, Atraumatic, Bilateral external ears normal, external nose negative, oropharynx moist, No oral exudates.  Eyes: PERRL, EOMI, Conjunctiva normal, No discharge.  Neck: Normal range of motion, no tenderness, supple, no carotid bruits  Respiratory: Normal breath sounds, no respiratory distress, no wheezing, no rhonchi, no rales  Cardiovascular: Normal heart rate, normal rhythm, no murmurs, no rubs, no gallops.  Gi: Bowel sounds normal, soft, no tenderness, non-distended, no masses, no pulsatile masses.  Musculoskeletal: Ambulatory with cane. + Right lower lumbar tenderness " to palpation.   Integument: Warm, Dry, No erythema, no rash  Neurologic: Normal motor function, normal sensory function. No focal deficits noted. Intact distal pulses  Psychiatric: Affect normal, judgment normal, mood normal      LABS  Pertinent labs reviewed. (see chart for details)  Labs Reviewed - No data to display    RADIOLOGY  No orders to display         PROCEDURE  Procedures      ED COURSE & MEDICAL DECISION MAKING     MDM       Physical exam findings discussed with patient. No acute emergent medical condition identified at this time to warrant further testing. Will dispo home with instructions to follow up with PCP, return to the ED for worsening condition. Pt agrees with plan of care.     DISPOSITION  Patient discharged in stable condition 12/15/2018 12:39 PM      CLINICAL IMPRESSION:  The primary encounter diagnosis was Chronic right-sided low back pain with right-sided sciatica. A diagnosis of Sciatica of right side was also pertinent to this visit.    Patient advised to follow-up with your PCP within 3 days for BP re-check if Blood Pressure was >120/80 without history of hypertension.         INGRID Garrett  12/15/18 [id number]

## 2019-01-10 RX ORDER — OMEPRAZOLE 40 MG/1
1 CAPSULE, DELAYED RELEASE ORAL
COMMUNITY
Start: 2018-04-12

## 2019-01-28 ENCOUNTER — OFFICE VISIT (OUTPATIENT)
Dept: SURGERY | Facility: CLINIC | Age: 59
End: 2019-01-28
Payer: MEDICAID

## 2019-01-28 VITALS
HEIGHT: 66 IN | TEMPERATURE: 100 F | BODY MASS INDEX: 20.41 KG/M2 | OXYGEN SATURATION: 96 % | HEART RATE: 89 BPM | DIASTOLIC BLOOD PRESSURE: 83 MMHG | SYSTOLIC BLOOD PRESSURE: 127 MMHG | WEIGHT: 127 LBS

## 2019-01-28 DIAGNOSIS — Z12.11 ENCOUNTER FOR SCREENING COLONOSCOPY: Primary | ICD-10-CM

## 2019-01-28 PROBLEM — G89.29 OTHER CHRONIC PAIN: Status: ACTIVE | Noted: 2019-01-28

## 2019-01-28 PROCEDURE — 99203 PR OFFICE/OUTPT VISIT, NEW, LEVL III, 30-44 MIN: ICD-10-PCS | Mod: S$GLB,,, | Performed by: SURGERY

## 2019-01-28 PROCEDURE — 99203 OFFICE O/P NEW LOW 30 MIN: CPT | Mod: S$GLB,,, | Performed by: SURGERY

## 2019-01-28 RX ORDER — SODIUM, POTASSIUM,MAG SULFATES 17.5-3.13G
SOLUTION, RECONSTITUTED, ORAL ORAL
Qty: 2 BOTTLE | Refills: 0 | Status: ON HOLD | OUTPATIENT
Start: 2019-01-28 | End: 2019-02-25 | Stop reason: HOSPADM

## 2019-01-28 RX ORDER — LIDOCAINE HYDROCHLORIDE 10 MG/ML
1 INJECTION, SOLUTION EPIDURAL; INFILTRATION; INTRACAUDAL; PERINEURAL ONCE
Status: CANCELLED | OUTPATIENT
Start: 2019-01-28 | End: 2019-01-28

## 2019-01-28 RX ORDER — SODIUM CHLORIDE, SODIUM LACTATE, POTASSIUM CHLORIDE, CALCIUM CHLORIDE 600; 310; 30; 20 MG/100ML; MG/100ML; MG/100ML; MG/100ML
INJECTION, SOLUTION INTRAVENOUS CONTINUOUS
Status: CANCELLED | OUTPATIENT
Start: 2019-01-28

## 2019-01-28 NOTE — H&P (VIEW-ONLY)
"[facility] - General Surgery  General Surgery  H&P      Patient Name: Karis Suarez  MRN: [id number]     Chief Complaint: "I am having a colonoscopy"    HPI:  Ms. Suarez is seen today in consultation from [facility] for colon cancer screening. She has no complaints. No abdominal pain, nausea, vomiting, diarrhea, constipation, melena, hematochezia, change in bowel habits, change in stool characteristics, weight loss, decrease in caliber of stool, etc. No family history of colon cancer. No aggravating or alleviating factors. No other associated symptoms. No prior colonoscopy.        Allergies & Meds:  Review of patient's allergies indicates:  No Known Allergies    Current Outpatient Medications   Medication Sig Dispense Refill    GABAPENTIN ORAL Take by mouth.      HYDROcodone-acetaminophen (NORCO)  mg per tablet Take 1 tablet by mouth every 8 (eight) hours as needed for Pain. 10 tablet 0    letrozole (FEMARA) 2.5 mg Tab Take 2.5 mg by mouth once daily.      omeprazole (PRILOSEC) 40 MG capsule Take 1 capsule by mouth.      pravastatin sodium (PRAVASTATIN ORAL) Take by mouth.      sodium,potassium,mag sulfates (SUPREP BOWEL PREP KIT) 17.5-3.13-1.6 gram SolR Follow written instructions provided by Clinic 2 Bottle 0         PMFSHx:  Past Medical History:   Diagnosis Date    AVN (avascular necrosis of bone)     Blood clot in vein     Breast cancer     Hypercholesteremia     Pulmonary embolism        Past Surgical History:   Procedure Laterality Date    FOOT SURGERY Left     TUBAL LIGATION         Family History   Problem Relation Age of Onset    Breast cancer Mother     Skin cancer Mother     Skin cancer Brother        Social History     Tobacco Use    Smoking status: Current Every Day Smoker     Packs/day: 0.50     Types: Cigarettes    Smokeless tobacco: Never Used   Substance Use Topics    Alcohol use: Yes     Comment: Occasional    Drug use: No       Review of " Systems   Constitutional: Negative for appetite change, chills, fatigue, fever and unexpected weight change.   HENT: Negative for congestion, dental problem, ear pain, mouth sores, postnasal drip, rhinorrhea, sore throat, tinnitus, trouble swallowing and voice change.    Eyes: Negative for photophobia, pain, discharge and visual disturbance.   Respiratory: Negative for cough, chest tightness, shortness of breath and wheezing.    Cardiovascular: Negative for chest pain, palpitations and leg swelling.   Gastrointestinal: Negative for abdominal pain, blood in stool, constipation, diarrhea, nausea and vomiting.   Endocrine: Negative for cold intolerance, heat intolerance, polydipsia, polyphagia and polyuria.   Genitourinary: Negative for difficulty urinating, dysuria, flank pain, frequency, hematuria and urgency.   Musculoskeletal: Negative for arthralgias, joint swelling and myalgias.   Skin: Negative for color change and rash.   Allergic/Immunologic: Negative for immunocompromised state.   Neurological: Negative for dizziness, tremors, seizures, syncope, speech difficulty, weakness, numbness and headaches.   Hematological: Negative for adenopathy. Does not bruise/bleed easily.   Psychiatric/Behavioral: Negative for agitation, confusion, hallucinations, self-injury and suicidal ideas. The patient is not nervous/anxious.           Physical Exam   Constitutional: She is oriented to person, place, and time. She appears well-developed and well-nourished. She is active.  Non-toxic appearance. No distress.   HENT:   Head: Normocephalic and atraumatic.   Right Ear: Hearing and external ear normal. No drainage or tenderness.   Left Ear: Hearing and external ear normal. No drainage or tenderness.   Nose: Nose normal. No rhinorrhea. No epistaxis.   Mouth/Throat: Uvula is midline, oropharynx is clear and moist and mucous membranes are normal. Mucous membranes are not pale, not dry and not cyanotic. No oropharyngeal exudate.    Eyes: Conjunctivae and lids are normal. Pupils are equal, round, and reactive to light. Right eye exhibits no discharge and no exudate. Left eye exhibits no discharge and no exudate. Right conjunctiva is not injected. Right eye exhibits no nystagmus. Left eye exhibits no nystagmus.   Neck: Trachea normal, full passive range of motion without pain and phonation normal. No JVD present. Carotid bruit is not present. No tracheal deviation present. No thyroid mass and no thyromegaly present.   Cardiovascular: Normal rate, regular rhythm, S1 normal, S2 normal, normal heart sounds and intact distal pulses. PMI is not displaced. Exam reveals no gallop and no friction rub.   No murmur heard.  Pulmonary/Chest: Effort normal and breath sounds normal. No accessory muscle usage. No respiratory distress. She exhibits no mass, no tenderness and no crepitus. Right breast exhibits no mass, no nipple discharge, no skin change and no tenderness. Left breast exhibits no inverted nipple, no mass, no nipple discharge, no skin change and no tenderness.   Postoperative changes consistent with right partial mastectomy and axillary node sampling.   Abdominal: Soft. Normal appearance and bowel sounds are normal. She exhibits no distension, no fluid wave, no abdominal bruit and no mass. There is no hepatosplenomegaly. There is no tenderness. There is no rebound, no guarding and negative Prakash's sign. No hernia. Hernia confirmed negative in the right inguinal area and confirmed negative in the left inguinal area.   Genitourinary: Rectum normal and vagina normal. There is no rash or lesion on the right labia. There is no rash or lesion on the left labia. Right adnexum displays no mass. Left adnexum displays no mass. No vaginal discharge found.   Musculoskeletal:        Cervical back: Normal.        Thoracic back: Normal.        Lumbar back: Normal.        Right upper arm: Normal.        Left upper arm: Normal.        Right forearm: Normal.         Left forearm: Normal.        Right hand: Normal.        Left hand: Normal.        Right upper leg: Normal.        Left upper leg: Normal.        Right lower leg: Normal.        Left lower leg: Normal.        Right foot: Normal.        Left foot: Normal.   Lymphadenopathy:        Head (right side): No submental, no submandibular and no posterior auricular adenopathy present.        Head (left side): No submental, no submandibular and no posterior auricular adenopathy present.     She has no cervical adenopathy.     She has no axillary adenopathy.        Right: No inguinal and no supraclavicular adenopathy present.        Left: No inguinal and no supraclavicular adenopathy present.   Neurological: She is alert and oriented to person, place, and time. She has normal strength. No cranial nerve deficit or sensory deficit. Coordination and gait normal. GCS eye subscore is 4. GCS verbal subscore is 5. GCS motor subscore is 6.   Skin: Skin is warm, dry and intact. No rash noted. No cyanosis. Nails show no clubbing.   Psychiatric: She has a normal mood and affect. Her speech is normal. Judgment and thought content normal. Her mood appears not anxious. Her affect is not inappropriate. She is not actively hallucinating. She does not exhibit a depressed mood.         Test Results:  Pending    Assessment:       Due for screening colonoscopy.  Differential diagnosis includes but not limited to colorectal cancer, diverticulosis, hemorrhoids, angiodysplasias, inflammatory bowel disease, etc.  Options include endoscopy, radiologic studies, second opinion, empiric management, observation, capsule endoscopy, etc.  Multiple comorbid issues ( breast cancer, chronic pain, hyperlipidemia, tobacco abuse disorder ) increase the complexity of required perioperative evaluation & management, risks of complications, and likely will increase the difficulty and complexity of postoperative care, etc.  These issues must be factored in to  preoperative evaluation and perioperative management.      1. Encounter for screening colonoscopy        Plan:   Encounter for screening colonoscopy  -     Case Request Operating Room: COLONOSCOPY  -     Comprehensive metabolic panel; Future; Expected date: 01/28/2019  -     CBC auto differential; Future; Expected date: 01/28/2019  -     EKG 12-lead; Future; Expected date: 01/28/2019    Other orders  -     sodium,potassium,mag sulfates (SUPREP BOWEL PREP KIT) 17.5-3.13-1.6 gram SolR; Follow written instructions provided by Clinic  Dispense: 2 Bottle; Refill: 0  -     Full code; Standing  -     Place in Outpatient; Standing  -     Vital signs; Standing  -     Insert peripheral IV; Standing  -     lidocaine (PF) 10 mg/ml (1%) injection 10 mg  -     Verify beta-blocker dose taken within 24 hours if patient is prescribed beta-blocker; Standing  -     Height and weight; Standing  -     Verify discontinuation of antithrombotics; Standing  -     Verify blood consent; Standing  -     Verify consent; Standing  -     Diet NPO; Standing  -     lactated ringers infusion  -     IP VTE LOW RISK PATIENT; Standing        Follow-up in about 1 month (around 2/28/2019) for routine post-endosocpy visit.    Counseling/Medical Decision Making:  Karis Suarez was counseled regarding the results of the evaluation thus far and the differential diagnosis.  Diagnostic and therapeutic options were discussed in detail along with the risks, benefits, possible complications, details of, and indications for each option.  Diagnoses discussed included but were not limited to: hemorrhoids, diverticulosis, cancer, IBD, IBS, benign tumors, angiodysplasias.  Options discussed included but were not limited to: colonoscopy, proctoscopy, anoscopy, sigmoidoscopy, radiologic studies, PillCam, empiric therapy, second opinion, etc. Possible complications of colonoscopy discussed included but were not limited to: bleeding, infections, endocarditis, injury to  internal organs, incomplete exam, failure to diagnose cancer or other serious condition, need for emergency surgery, need for a temporary colostomy, need for additional operations or procedures, etc.  Entire conversation was held in layman's terms.  Questions solicited and answered.  I read the consent form to her verbatim.  All unfamiliar terms were defined.  Krames booklets were provided on colonoscopy, polyps, diverticulosis, hemorrhoids, cancer, etc.  A copy of the consent form was provided as well for her review outside the office / hospital prior to the procedure.  At the conclusion of the conversation she voiced complete understanding of all we discussed and satisfaction that all of her questions had been answered to her full understanding.  She stated that she felt fully informed and totally capable of making an informed decision.  She desires and requests to proceed with colonoscopy.

## 2019-01-28 NOTE — PROGRESS NOTES
Subjective:       Patient ID: Karis Suarez is a 59 y.o. female.    Chief Complaint: Consult      HPI:  Ms. Suarez is seen today in consultation from [facility] for colon cancer screening. She has no complaints. No abdominal pain, nausea, vomiting, diarrhea, constipation, melena, hematochezia, change in bowel habits, change in stool characteristics, weight loss, decrease in caliber of stool, etc. No family history of colon cancer. No aggravating or alleviating factors. No other associated symptoms. No prior colonoscopy.        Allergies & Meds:  Review of patient's allergies indicates:  No Known Allergies    Current Outpatient Medications   Medication Sig Dispense Refill    GABAPENTIN ORAL Take by mouth.      HYDROcodone-acetaminophen (NORCO)  mg per tablet Take 1 tablet by mouth every 8 (eight) hours as needed for Pain. 10 tablet 0    letrozole (FEMARA) 2.5 mg Tab Take 2.5 mg by mouth once daily.      omeprazole (PRILOSEC) 40 MG capsule Take 1 capsule by mouth.      pravastatin sodium (PRAVASTATIN ORAL) Take by mouth.      sodium,potassium,mag sulfates (SUPREP BOWEL PREP KIT) 17.5-3.13-1.6 gram SolR Follow written instructions provided by Clinic 2 Bottle 0     No current facility-administered medications for this visit.        PMFSHx:  Past Medical History:   Diagnosis Date    AVN (avascular necrosis of bone)     Blood clot in vein     Breast cancer     Hypercholesteremia     Pulmonary embolism        Past Surgical History:   Procedure Laterality Date    FOOT SURGERY Left     TUBAL LIGATION         Family History   Problem Relation Age of Onset    Breast cancer Mother     Skin cancer Mother     Skin cancer Brother        Social History     Tobacco Use    Smoking status: Current Every Day Smoker     Packs/day: 0.50     Types: Cigarettes    Smokeless tobacco: Never Used   Substance Use Topics    Alcohol use: Yes     Comment: Occasional    Drug use: No       Review of Systems    Constitutional: Negative for appetite change, chills, fatigue, fever and unexpected weight change.   HENT: Negative for congestion, dental problem, ear pain, mouth sores, postnasal drip, rhinorrhea, sore throat, tinnitus, trouble swallowing and voice change.    Eyes: Negative for photophobia, pain, discharge and visual disturbance.   Respiratory: Negative for cough, chest tightness, shortness of breath and wheezing.    Cardiovascular: Negative for chest pain, palpitations and leg swelling.   Gastrointestinal: Negative for abdominal pain, blood in stool, constipation, diarrhea, nausea and vomiting.   Endocrine: Negative for cold intolerance, heat intolerance, polydipsia, polyphagia and polyuria.   Genitourinary: Negative for difficulty urinating, dysuria, flank pain, frequency, hematuria and urgency.   Musculoskeletal: Negative for arthralgias, joint swelling and myalgias.   Skin: Negative for color change and rash.   Allergic/Immunologic: Negative for immunocompromised state.   Neurological: Negative for dizziness, tremors, seizures, syncope, speech difficulty, weakness, numbness and headaches.   Hematological: Negative for adenopathy. Does not bruise/bleed easily.   Psychiatric/Behavioral: Negative for agitation, confusion, hallucinations, self-injury and suicidal ideas. The patient is not nervous/anxious.        Objective:      Physical Exam   Constitutional: She is oriented to person, place, and time. She appears well-developed and well-nourished. She is active.  Non-toxic appearance. No distress.   HENT:   Head: Normocephalic and atraumatic.   Right Ear: Hearing and external ear normal. No drainage or tenderness.   Left Ear: Hearing and external ear normal. No drainage or tenderness.   Nose: Nose normal. No rhinorrhea. No epistaxis.   Mouth/Throat: Uvula is midline, oropharynx is clear and moist and mucous membranes are normal. Mucous membranes are not pale, not dry and not cyanotic. No oropharyngeal exudate.    Eyes: Conjunctivae and lids are normal. Pupils are equal, round, and reactive to light. Right eye exhibits no discharge and no exudate. Left eye exhibits no discharge and no exudate. Right conjunctiva is not injected. Right eye exhibits no nystagmus. Left eye exhibits no nystagmus.   Neck: Trachea normal, full passive range of motion without pain and phonation normal. No JVD present. Carotid bruit is not present. No tracheal deviation present. No thyroid mass and no thyromegaly present.   Cardiovascular: Normal rate, regular rhythm, S1 normal, S2 normal, normal heart sounds and intact distal pulses. PMI is not displaced. Exam reveals no gallop and no friction rub.   No murmur heard.  Pulmonary/Chest: Effort normal and breath sounds normal. No accessory muscle usage. No respiratory distress. She exhibits no mass, no tenderness and no crepitus. Right breast exhibits no mass, no nipple discharge, no skin change and no tenderness. Left breast exhibits no inverted nipple, no mass, no nipple discharge, no skin change and no tenderness.   Postoperative changes consistent with right partial mastectomy and axillary node sampling.   Abdominal: Soft. Normal appearance and bowel sounds are normal. She exhibits no distension, no fluid wave, no abdominal bruit and no mass. There is no hepatosplenomegaly. There is no tenderness. There is no rebound, no guarding and negative Prakash's sign. No hernia. Hernia confirmed negative in the right inguinal area and confirmed negative in the left inguinal area.   Genitourinary: Rectum normal and vagina normal. There is no rash or lesion on the right labia. There is no rash or lesion on the left labia. Right adnexum displays no mass. Left adnexum displays no mass. No vaginal discharge found.   Musculoskeletal:        Cervical back: Normal.        Thoracic back: Normal.        Lumbar back: Normal.        Right upper arm: Normal.        Left upper arm: Normal.        Right forearm: Normal.         Left forearm: Normal.        Right hand: Normal.        Left hand: Normal.        Right upper leg: Normal.        Left upper leg: Normal.        Right lower leg: Normal.        Left lower leg: Normal.        Right foot: Normal.        Left foot: Normal.   Lymphadenopathy:        Head (right side): No submental, no submandibular and no posterior auricular adenopathy present.        Head (left side): No submental, no submandibular and no posterior auricular adenopathy present.     She has no cervical adenopathy.     She has no axillary adenopathy.        Right: No inguinal and no supraclavicular adenopathy present.        Left: No inguinal and no supraclavicular adenopathy present.   Neurological: She is alert and oriented to person, place, and time. She has normal strength. No cranial nerve deficit or sensory deficit. Coordination and gait normal. GCS eye subscore is 4. GCS verbal subscore is 5. GCS motor subscore is 6.   Skin: Skin is warm, dry and intact. No rash noted. No cyanosis. Nails show no clubbing.   Psychiatric: She has a normal mood and affect. Her speech is normal. Judgment and thought content normal. Her mood appears not anxious. Her affect is not inappropriate. She is not actively hallucinating. She does not exhibit a depressed mood.         Test Results:  Pending    Assessment:       Due for screening colonoscopy.  Differential diagnosis includes but not limited to colorectal cancer, diverticulosis, hemorrhoids, angiodysplasias, inflammatory bowel disease, etc.  Options include endoscopy, radiologic studies, second opinion, empiric management, observation, capsule endoscopy, etc.  Multiple comorbid issues ( breast cancer, chronic pain, hyperlipidemia, tobacco abuse disorder ) increase the complexity of required perioperative evaluation & management, risks of complications, and likely will increase the difficulty and complexity of postoperative care, etc.  These issues must be factored in to  preoperative evaluation and perioperative management.      1. Encounter for screening colonoscopy        Plan:   Encounter for screening colonoscopy  -     Case Request Operating Room: COLONOSCOPY  -     Comprehensive metabolic panel; Future; Expected date: 01/28/2019  -     CBC auto differential; Future; Expected date: 01/28/2019  -     EKG 12-lead; Future; Expected date: 01/28/2019    Other orders  -     sodium,potassium,mag sulfates (SUPREP BOWEL PREP KIT) 17.5-3.13-1.6 gram SolR; Follow written instructions provided by Clinic  Dispense: 2 Bottle; Refill: 0  -     Full code; Standing  -     Place in Outpatient; Standing  -     Vital signs; Standing  -     Insert peripheral IV; Standing  -     lidocaine (PF) 10 mg/ml (1%) injection 10 mg  -     Verify beta-blocker dose taken within 24 hours if patient is prescribed beta-blocker; Standing  -     Height and weight; Standing  -     Verify discontinuation of antithrombotics; Standing  -     Verify blood consent; Standing  -     Verify consent; Standing  -     Diet NPO; Standing  -     lactated ringers infusion  -     IP VTE LOW RISK PATIENT; Standing        Follow-up in about 1 month (around 2/28/2019) for routine post-endosocpy visit.    Counseling/Medical Decision Making:  Karis Suarez was counseled regarding the results of the evaluation thus far and the differential diagnosis.  Diagnostic and therapeutic options were discussed in detail along with the risks, benefits, possible complications, details of, and indications for each option.  Diagnoses discussed included but were not limited to: hemorrhoids, diverticulosis, cancer, IBD, IBS, benign tumors, angiodysplasias.  Options discussed included but were not limited to: colonoscopy, proctoscopy, anoscopy, sigmoidoscopy, radiologic studies, PillCam, empiric therapy, second opinion, etc. Possible complications of colonoscopy discussed included but were not limited to: bleeding, infections, endocarditis, injury to  internal organs, incomplete exam, failure to diagnose cancer or other serious condition, need for emergency surgery, need for a temporary colostomy, need for additional operations or procedures, etc.  Entire conversation was held in layman's terms.  Questions solicited and answered.  I read the consent form to her verbatim.  All unfamiliar terms were defined.  Krames booklets were provided on colonoscopy, polyps, diverticulosis, hemorrhoids, cancer, etc.  A copy of the consent form was provided as well for her review outside the office / hospital prior to the procedure.  At the conclusion of the conversation she voiced complete understanding of all we discussed and satisfaction that all of her questions had been answered to her full understanding.  She stated that she felt fully informed and totally capable of making an informed decision.  She desires and requests to proceed with colonoscopy.    Total face-to-face encounter time was 60 minutes, 40 minutes spent counseling as outlined/summarized above.

## 2019-01-28 NOTE — H&P
"[facility] - General Surgery  General Surgery  H&P      Patient Name: Karis Suarez  MRN: [id number]     Chief Complaint: "I am having a colonoscopy"    HPI:  Ms. Suarez is seen today in consultation from [facility] for colon cancer screening. She has no complaints. No abdominal pain, nausea, vomiting, diarrhea, constipation, melena, hematochezia, change in bowel habits, change in stool characteristics, weight loss, decrease in caliber of stool, etc. No family history of colon cancer. No aggravating or alleviating factors. No other associated symptoms. No prior colonoscopy.        Allergies & Meds:  Review of patient's allergies indicates:  No Known Allergies    Current Outpatient Medications   Medication Sig Dispense Refill    GABAPENTIN ORAL Take by mouth.      HYDROcodone-acetaminophen (NORCO)  mg per tablet Take 1 tablet by mouth every 8 (eight) hours as needed for Pain. 10 tablet 0    letrozole (FEMARA) 2.5 mg Tab Take 2.5 mg by mouth once daily.      omeprazole (PRILOSEC) 40 MG capsule Take 1 capsule by mouth.      pravastatin sodium (PRAVASTATIN ORAL) Take by mouth.      sodium,potassium,mag sulfates (SUPREP BOWEL PREP KIT) 17.5-3.13-1.6 gram SolR Follow written instructions provided by Clinic 2 Bottle 0         PMFSHx:  Past Medical History:   Diagnosis Date    AVN (avascular necrosis of bone)     Blood clot in vein     Breast cancer     Hypercholesteremia     Pulmonary embolism        Past Surgical History:   Procedure Laterality Date    FOOT SURGERY Left     TUBAL LIGATION         Family History   Problem Relation Age of Onset    Breast cancer Mother     Skin cancer Mother     Skin cancer Brother        Social History     Tobacco Use    Smoking status: Current Every Day Smoker     Packs/day: 0.50     Types: Cigarettes    Smokeless tobacco: Never Used   Substance Use Topics    Alcohol use: Yes     Comment: Occasional    Drug use: No       Review of " Systems   Constitutional: Negative for appetite change, chills, fatigue, fever and unexpected weight change.   HENT: Negative for congestion, dental problem, ear pain, mouth sores, postnasal drip, rhinorrhea, sore throat, tinnitus, trouble swallowing and voice change.    Eyes: Negative for photophobia, pain, discharge and visual disturbance.   Respiratory: Negative for cough, chest tightness, shortness of breath and wheezing.    Cardiovascular: Negative for chest pain, palpitations and leg swelling.   Gastrointestinal: Negative for abdominal pain, blood in stool, constipation, diarrhea, nausea and vomiting.   Endocrine: Negative for cold intolerance, heat intolerance, polydipsia, polyphagia and polyuria.   Genitourinary: Negative for difficulty urinating, dysuria, flank pain, frequency, hematuria and urgency.   Musculoskeletal: Negative for arthralgias, joint swelling and myalgias.   Skin: Negative for color change and rash.   Allergic/Immunologic: Negative for immunocompromised state.   Neurological: Negative for dizziness, tremors, seizures, syncope, speech difficulty, weakness, numbness and headaches.   Hematological: Negative for adenopathy. Does not bruise/bleed easily.   Psychiatric/Behavioral: Negative for agitation, confusion, hallucinations, self-injury and suicidal ideas. The patient is not nervous/anxious.           Physical Exam   Constitutional: She is oriented to person, place, and time. She appears well-developed and well-nourished. She is active.  Non-toxic appearance. No distress.   HENT:   Head: Normocephalic and atraumatic.   Right Ear: Hearing and external ear normal. No drainage or tenderness.   Left Ear: Hearing and external ear normal. No drainage or tenderness.   Nose: Nose normal. No rhinorrhea. No epistaxis.   Mouth/Throat: Uvula is midline, oropharynx is clear and moist and mucous membranes are normal. Mucous membranes are not pale, not dry and not cyanotic. No oropharyngeal exudate.    Eyes: Conjunctivae and lids are normal. Pupils are equal, round, and reactive to light. Right eye exhibits no discharge and no exudate. Left eye exhibits no discharge and no exudate. Right conjunctiva is not injected. Right eye exhibits no nystagmus. Left eye exhibits no nystagmus.   Neck: Trachea normal, full passive range of motion without pain and phonation normal. No JVD present. Carotid bruit is not present. No tracheal deviation present. No thyroid mass and no thyromegaly present.   Cardiovascular: Normal rate, regular rhythm, S1 normal, S2 normal, normal heart sounds and intact distal pulses. PMI is not displaced. Exam reveals no gallop and no friction rub.   No murmur heard.  Pulmonary/Chest: Effort normal and breath sounds normal. No accessory muscle usage. No respiratory distress. She exhibits no mass, no tenderness and no crepitus. Right breast exhibits no mass, no nipple discharge, no skin change and no tenderness. Left breast exhibits no inverted nipple, no mass, no nipple discharge, no skin change and no tenderness.   Postoperative changes consistent with right partial mastectomy and axillary node sampling.   Abdominal: Soft. Normal appearance and bowel sounds are normal. She exhibits no distension, no fluid wave, no abdominal bruit and no mass. There is no hepatosplenomegaly. There is no tenderness. There is no rebound, no guarding and negative Prakash's sign. No hernia. Hernia confirmed negative in the right inguinal area and confirmed negative in the left inguinal area.   Genitourinary: Rectum normal and vagina normal. There is no rash or lesion on the right labia. There is no rash or lesion on the left labia. Right adnexum displays no mass. Left adnexum displays no mass. No vaginal discharge found.   Musculoskeletal:        Cervical back: Normal.        Thoracic back: Normal.        Lumbar back: Normal.        Right upper arm: Normal.        Left upper arm: Normal.        Right forearm: Normal.         Left forearm: Normal.        Right hand: Normal.        Left hand: Normal.        Right upper leg: Normal.        Left upper leg: Normal.        Right lower leg: Normal.        Left lower leg: Normal.        Right foot: Normal.        Left foot: Normal.   Lymphadenopathy:        Head (right side): No submental, no submandibular and no posterior auricular adenopathy present.        Head (left side): No submental, no submandibular and no posterior auricular adenopathy present.     She has no cervical adenopathy.     She has no axillary adenopathy.        Right: No inguinal and no supraclavicular adenopathy present.        Left: No inguinal and no supraclavicular adenopathy present.   Neurological: She is alert and oriented to person, place, and time. She has normal strength. No cranial nerve deficit or sensory deficit. Coordination and gait normal. GCS eye subscore is 4. GCS verbal subscore is 5. GCS motor subscore is 6.   Skin: Skin is warm, dry and intact. No rash noted. No cyanosis. Nails show no clubbing.   Psychiatric: She has a normal mood and affect. Her speech is normal. Judgment and thought content normal. Her mood appears not anxious. Her affect is not inappropriate. She is not actively hallucinating. She does not exhibit a depressed mood.         Test Results:  Pending    Assessment:       Due for screening colonoscopy.  Differential diagnosis includes but not limited to colorectal cancer, diverticulosis, hemorrhoids, angiodysplasias, inflammatory bowel disease, etc.  Options include endoscopy, radiologic studies, second opinion, empiric management, observation, capsule endoscopy, etc.  Multiple comorbid issues ( breast cancer, chronic pain, hyperlipidemia, tobacco abuse disorder ) increase the complexity of required perioperative evaluation & management, risks of complications, and likely will increase the difficulty and complexity of postoperative care, etc.  These issues must be factored in to  preoperative evaluation and perioperative management.      1. Encounter for screening colonoscopy        Plan:   Encounter for screening colonoscopy  -     Case Request Operating Room: COLONOSCOPY  -     Comprehensive metabolic panel; Future; Expected date: 01/28/2019  -     CBC auto differential; Future; Expected date: 01/28/2019  -     EKG 12-lead; Future; Expected date: 01/28/2019    Other orders  -     sodium,potassium,mag sulfates (SUPREP BOWEL PREP KIT) 17.5-3.13-1.6 gram SolR; Follow written instructions provided by Clinic  Dispense: 2 Bottle; Refill: 0  -     Full code; Standing  -     Place in Outpatient; Standing  -     Vital signs; Standing  -     Insert peripheral IV; Standing  -     lidocaine (PF) 10 mg/ml (1%) injection 10 mg  -     Verify beta-blocker dose taken within 24 hours if patient is prescribed beta-blocker; Standing  -     Height and weight; Standing  -     Verify discontinuation of antithrombotics; Standing  -     Verify blood consent; Standing  -     Verify consent; Standing  -     Diet NPO; Standing  -     lactated ringers infusion  -     IP VTE LOW RISK PATIENT; Standing        Follow-up in about 1 month (around 2/28/2019) for routine post-endosocpy visit.    Counseling/Medical Decision Making:  Karis Suarez was counseled regarding the results of the evaluation thus far and the differential diagnosis.  Diagnostic and therapeutic options were discussed in detail along with the risks, benefits, possible complications, details of, and indications for each option.  Diagnoses discussed included but were not limited to: hemorrhoids, diverticulosis, cancer, IBD, IBS, benign tumors, angiodysplasias.  Options discussed included but were not limited to: colonoscopy, proctoscopy, anoscopy, sigmoidoscopy, radiologic studies, PillCam, empiric therapy, second opinion, etc. Possible complications of colonoscopy discussed included but were not limited to: bleeding, infections, endocarditis, injury to  internal organs, incomplete exam, failure to diagnose cancer or other serious condition, need for emergency surgery, need for a temporary colostomy, need for additional operations or procedures, etc.  Entire conversation was held in layman's terms.  Questions solicited and answered.  I read the consent form to her verbatim.  All unfamiliar terms were defined.  Krames booklets were provided on colonoscopy, polyps, diverticulosis, hemorrhoids, cancer, etc.  A copy of the consent form was provided as well for her review outside the office / hospital prior to the procedure.  At the conclusion of the conversation she voiced complete understanding of all we discussed and satisfaction that all of her questions had been answered to her full understanding.  She stated that she felt fully informed and totally capable of making an informed decision.  She desires and requests to proceed with colonoscopy.

## 2019-01-28 NOTE — LETTER
January 28, 2019      Olivia Ruth, NP  [address] [place] [address] [address]  [address] [place] [address]           [facility] - General Surgery  [address] [facility] [place] [address]  Phone: [phone]  Fax: [phone]          Patient: Karis Suarez   MR Number: [id number]   YOB: 1960   Date of Visit: 1/28/2019       Dear Dr. Olivia Ruth:    Thank you for referring Karis Suarez to me for evaluation. Attached you will find relevant portions of my assessment and plan of care.    If you have questions, please do not hesitate to call me. I look forward to following Karis Suarez along with you.    Sincerely,    Jaxson Chow MD    Enclosure  CC:  No Recipients    If you would like to receive this communication electronically, please contact [email or web address] or [phone] to request more information on [employer] access.    For providers and/or their staff who would like to refer a patient to [facility], please contact us through our one-stop-shop provider referral line, [facility], at [phone].    If you feel you have received this communication in error or would no longer like to receive these types of communications, please e-mail [email or web address]

## 2019-02-18 ENCOUNTER — HOSPITAL ENCOUNTER (OUTPATIENT)
Dept: CARDIOLOGY | Facility: HOSPITAL | Age: 59
Discharge: HOME | End: 2019-02-18
Attending: SURGERY
Payer: MEDICAID

## 2019-02-18 DIAGNOSIS — Z12.11 ENCOUNTER FOR SCREENING COLONOSCOPY: ICD-10-CM

## 2019-02-18 PROCEDURE — 93010 EKG 12-LEAD: ICD-10-PCS | Mod: ,,, | Performed by: INTERNAL MEDICINE

## 2019-02-18 PROCEDURE — 93010 ELECTROCARDIOGRAM REPORT: CPT | Mod: ,,, | Performed by: INTERNAL MEDICINE

## 2019-02-18 PROCEDURE — 93005 ELECTROCARDIOGRAM TRACING: CPT

## 2019-02-25 ENCOUNTER — HOSPITAL ENCOUNTER (OUTPATIENT)
Facility: HOSPITAL | Age: 59
Discharge: HOME | End: 2019-02-25
Attending: SURGERY | Admitting: SURGERY
Payer: MEDICAID

## 2019-02-25 ENCOUNTER — ANESTHESIA (OUTPATIENT)
Dept: SURGERY | Facility: HOSPITAL | Age: 59
End: 2019-02-25
Payer: MEDICAID

## 2019-02-25 ENCOUNTER — ANESTHESIA EVENT (OUTPATIENT)
Dept: SURGERY | Facility: HOSPITAL | Age: 59
End: 2019-02-25
Payer: MEDICAID

## 2019-02-25 VITALS
HEART RATE: 78 BPM | SYSTOLIC BLOOD PRESSURE: 135 MMHG | TEMPERATURE: 98 F | OXYGEN SATURATION: 98 % | RESPIRATION RATE: 17 BRPM | DIASTOLIC BLOOD PRESSURE: 82 MMHG

## 2019-02-25 DIAGNOSIS — Z12.11 ENCOUNTER FOR SCREENING COLONOSCOPY: ICD-10-CM

## 2019-02-25 PROCEDURE — 37000009 HC ANESTHESIA EA ADD 15 MINS: Performed by: SURGERY

## 2019-02-25 PROCEDURE — 00811 ANES LWR INTST NDSC NOS: CPT | Performed by: SURGERY

## 2019-02-25 PROCEDURE — 63600175 PHARM REV CODE 636 W HCPCS: Performed by: NURSE ANESTHETIST, CERTIFIED REGISTERED

## 2019-02-25 PROCEDURE — 45380 COLONOSCOPY AND BIOPSY: CPT | Mod: ,,, | Performed by: SURGERY

## 2019-02-25 PROCEDURE — 25000003 PHARM REV CODE 250

## 2019-02-25 PROCEDURE — 88305 TISSUE EXAM BY PATHOLOGIST: CPT | Mod: 26,,, | Performed by: PATHOLOGY

## 2019-02-25 PROCEDURE — 88305 TISSUE SPECIMEN TO PATHOLOGY - SURGERY: ICD-10-PCS | Mod: 26,,, | Performed by: PATHOLOGY

## 2019-02-25 PROCEDURE — 88305 TISSUE EXAM BY PATHOLOGIST: CPT | Performed by: PATHOLOGY

## 2019-02-25 PROCEDURE — 45380 PR COLONOSCOPY,BIOPSY: ICD-10-PCS | Mod: ,,, | Performed by: SURGERY

## 2019-02-25 PROCEDURE — D9220A PRA ANESTHESIA: Mod: 33,,, | Performed by: ANESTHESIOLOGY

## 2019-02-25 PROCEDURE — D9220A PRA ANESTHESIA: ICD-10-PCS | Mod: 33,,, | Performed by: ANESTHESIOLOGY

## 2019-02-25 PROCEDURE — 25000003 PHARM REV CODE 250: Performed by: SURGERY

## 2019-02-25 PROCEDURE — 27201012 HC FORCEPS, HOT/COLD, DISP: Performed by: SURGERY

## 2019-02-25 PROCEDURE — 25000003 PHARM REV CODE 250: Performed by: NURSE ANESTHETIST, CERTIFIED REGISTERED

## 2019-02-25 PROCEDURE — 37000008 HC ANESTHESIA 1ST 15 MINUTES: Performed by: SURGERY

## 2019-02-25 PROCEDURE — S0028 INJECTION, FAMOTIDINE, 20 MG: HCPCS

## 2019-02-25 PROCEDURE — 45380 COLONOSCOPY AND BIOPSY: CPT | Mod: 59,PT | Performed by: SURGERY

## 2019-02-25 PROCEDURE — 45388 COLONOSCOPY W/ABLATION: CPT | Mod: PT | Performed by: SURGERY

## 2019-02-25 RX ORDER — SODIUM CHLORIDE, SODIUM LACTATE, POTASSIUM CHLORIDE, CALCIUM CHLORIDE 600; 310; 30; 20 MG/100ML; MG/100ML; MG/100ML; MG/100ML
125 INJECTION, SOLUTION INTRAVENOUS CONTINUOUS
Status: DISCONTINUED | OUTPATIENT
Start: 2019-02-25 | End: 2019-02-25 | Stop reason: HOSPADM

## 2019-02-25 RX ORDER — FAMOTIDINE 10 MG/ML
INJECTION INTRAVENOUS
Status: COMPLETED
Start: 2019-02-25 | End: 2019-02-25

## 2019-02-25 RX ORDER — SODIUM CHLORIDE, SODIUM LACTATE, POTASSIUM CHLORIDE, CALCIUM CHLORIDE 600; 310; 30; 20 MG/100ML; MG/100ML; MG/100ML; MG/100ML
INJECTION, SOLUTION INTRAVENOUS CONTINUOUS
Status: DISCONTINUED | OUTPATIENT
Start: 2019-02-25 | End: 2019-02-25 | Stop reason: HOSPADM

## 2019-02-25 RX ORDER — ONDANSETRON 2 MG/ML
4 INJECTION INTRAMUSCULAR; INTRAVENOUS DAILY PRN
Status: DISCONTINUED | OUTPATIENT
Start: 2019-02-25 | End: 2019-02-25 | Stop reason: HOSPADM

## 2019-02-25 RX ORDER — FAMOTIDINE 20 MG/50ML
20 INJECTION, SOLUTION INTRAVENOUS
Status: DISCONTINUED | OUTPATIENT
Start: 2019-02-25 | End: 2019-02-25

## 2019-02-25 RX ORDER — LIDOCAINE HYDROCHLORIDE 10 MG/ML
1 INJECTION, SOLUTION EPIDURAL; INFILTRATION; INTRACAUDAL; PERINEURAL ONCE
Status: DISCONTINUED | OUTPATIENT
Start: 2019-02-25 | End: 2019-02-25 | Stop reason: HOSPADM

## 2019-02-25 RX ORDER — PROPOFOL 10 MG/ML
VIAL (ML) INTRAVENOUS
Status: DISCONTINUED | OUTPATIENT
Start: 2019-02-25 | End: 2019-02-25

## 2019-02-25 RX ORDER — LIDOCAINE HYDROCHLORIDE 20 MG/ML
INJECTION, SOLUTION EPIDURAL; INFILTRATION; INTRACAUDAL; PERINEURAL
Status: DISCONTINUED | OUTPATIENT
Start: 2019-02-25 | End: 2019-02-25

## 2019-02-25 RX ORDER — FAMOTIDINE 10 MG/ML
20 INJECTION INTRAVENOUS ONCE
Status: DISCONTINUED | OUTPATIENT
Start: 2019-02-25 | End: 2019-02-25 | Stop reason: HOSPADM

## 2019-02-25 RX ORDER — DIPHENHYDRAMINE HYDROCHLORIDE 50 MG/ML
12.5 INJECTION INTRAMUSCULAR; INTRAVENOUS
Status: DISCONTINUED | OUTPATIENT
Start: 2019-02-25 | End: 2019-02-25 | Stop reason: HOSPADM

## 2019-02-25 RX ADMIN — PROPOFOL 100 MG: 10 INJECTION, EMULSION INTRAVENOUS at 07:02

## 2019-02-25 RX ADMIN — SODIUM CHLORIDE, POTASSIUM CHLORIDE, SODIUM LACTATE AND CALCIUM CHLORIDE: 600; 310; 30; 20 INJECTION, SOLUTION INTRAVENOUS at 07:02

## 2019-02-25 RX ADMIN — FAMOTIDINE 20 MG: 10 INJECTION INTRAVENOUS at 07:02

## 2019-02-25 RX ADMIN — LIDOCAINE HYDROCHLORIDE 50 MG: 20 INJECTION, SOLUTION EPIDURAL; INFILTRATION; INTRACAUDAL; PERINEURAL at 07:02

## 2019-02-25 NOTE — INTERVAL H&P NOTE
The patient has been examined and the H&P has been reviewed:    I concur with the findings and no changes have occurred since H&P was written.     Lab results were reviewed from 2/18/2019.  CBC shows no evidence of leukocytosis, anemia, or thrombocytopenia.  Electrolytes are all in the range of normal. BUN and creatinine shows no evidence of renal dysfunction.  Glucose shows no suspicion diabetes.  Liver profile shows no evidence of hepatocellular disease or biliary obstruction.    EKG reviewed from 2/18/2019.  Tracing showed a normal sinus rhythm with no evidence of any ischemic changes.    Karis Suarez was interviewed and examined again today preoperatively, H&P updated, consent completed, and she is ready to roll into the operating/procedure room at 0724    Surgery risks, benefits and alternative options discussed and understood by patient/family.          Active Hospital Problems    Diagnosis  POA    Encounter for screening colonoscopy [Z12.11]  Not Applicable      Resolved Hospital Problems   No resolved problems to display.

## 2019-02-25 NOTE — PLAN OF CARE
All Vss were inadvertently removed from computer before filling. Pt vss remand stable throughout with little deviation from initial bp which were recorded.

## 2019-02-25 NOTE — ANESTHESIA PREPROCEDURE EVALUATION
02/25/2019  Karis Suarez is a 59 y.o., female.    Anesthesia Evaluation    I have reviewed the Patient Summary Reports.    I have reviewed the Nursing Notes.   I have reviewed the Medications.     Review of Systems  Anesthesia Hx:  No problems with previous Anesthesia    Social:  Smoker    Hematology/Oncology:         Breast Current/Recent Cancer.   Neurological:   Chronic Pain Syndrome       Physical Exam  General:  Well nourished    Airway/Jaw/Neck:  Airway Findings: Mouth Opening: Normal Tongue: Normal  Mallampati: II  Jaw/Neck Findings:  Neck ROM: Normal ROM      Dental:  Dental Findings: Upper Dentures   Chest/Lungs:  Chest/Lungs Findings: Clear to auscultation     Heart/Vascular:  Heart Findings: Rate: Normal  Rhythm: Regular Rhythm        Mental Status:  Mental Status Findings:  Cooperative, Alert and Oriented         Anesthesia Plan  Type of Anesthesia, risks & benefits discussed:  Anesthesia Type:  general  Patient's Preference:   Intra-op Monitoring Plan: standard ASA monitors  Intra-op Monitoring Plan Comments:   Post Op Pain Control Plan: IV/PO Opioids PRN  Post Op Pain Control Plan Comments:   Induction:   IV  Beta Blocker:  Patient is not currently on a Beta-Blocker (No further documentation required).       Informed Consent: Patient understands risks and agrees with Anesthesia plan.  Questions answered. Anesthesia consent signed with patient.  ASA Score: 3     Day of Surgery Review of History & Physical: I have interviewed and examined the patient. I have reviewed the patient's H&P dated:            Ready For Surgery From Anesthesia Perspective.

## 2019-02-25 NOTE — [employer] PATIENT INSTRUCTIONS
Discharge Summary/Instructions after an Endoscopic Procedure  Patient Name: Karis Suarez  Patient MRN: [id number]  Patient YOB: 1960 Monday, February 25, 2019  Jaxson Chow MD  RESTRICTIONS:  During your procedure today, you received medications for sedation.  These   medications may affect your judgment, balance and coordination.  Therefore,   for 24 hours, you have the following restrictions:   - DO NOT drive a car, operate machinery, make legal/financial decisions,   sign important papers or drink alcohol.    ACTIVITY:  Today: no heavy lifting, straining or running due to procedural   sedation/anesthesia.  The following day: return to full activity including work.  DIET:  Eat and drink normally unless instructed otherwise.     TREATMENT FOR COMMON SIDE EFFECTS:  - Mild abdominal pain, nausea, belching, bloating or excessive gas:  rest,   eat lightly and use a heating pad.  - Sore Throat: treat with throat lozenges and/or gargle with warm salt   water.  - Because air was used during the procedure, expelling large amounts of air   from your rectum or belching is normal.  - If a bowel prep was taken, you may not have a bowel movement for 1-3 days.    This is normal.  SYMPTOMS TO WATCH FOR AND REPORT TO YOUR PHYSICIAN:  1. Abdominal pain or bloating, other than gas cramps.  2. Chest pain.  3. Back pain.  4. Signs of infection such as: chills or fever occurring within 24 hours   after the procedure.  5. Rectal bleeding, which would show as bright red, maroon, or black stools.   (A tablespoon of blood from the rectum is not serious, especially if   hemorrhoids are present.)  6. Vomiting.  7. Weakness or dizziness.  GO DIRECTLY TO THE NEAREST EMERGENCY ROOM IF YOU HAVE ANY OF THE FOLLOWING:      Difficulty breathing              Chills and/or fever over 101 F   Persistent vomiting and/or vomiting blood   Severe abdominal pain   Severe chest pain   Black, tarry stools   Bleeding- more than one  tablespoon   Any other symptom or condition that you feel may need urgent attention  Your doctor recommends these additional instructions:  If any biopsies were taken, your doctors clinic will contact you in 1 to 2   weeks with any results.  - Discharge patient to home.   - Resume previous diet.   - Continue present medications.   - Await pathology results.   - Repeat colonoscopy in 10 years for screening purposes.   - Return to my office in 2 weeks.   - Further recommendations will depend on clinical course  For questions, problems or results please call your physician - Jaxson Chow MD at Work:  [phone].  [facility] EMERGENCY ROOM PHONE NUMBER: [phone]  IF A COMPLICATION OR EMERGENCY SITUATION ARISES AND YOU ARE UNABLE TO REACH   YOUR PHYSICIAN - GO DIRECTLY TO THE EMERGENCY ROOM.  MD Jaxson Young MD  2/25/2019 8:09:15 AM  This report has been verified and signed electronically.  [employer]

## 2019-02-25 NOTE — TRANSFER OF CARE
Anesthesia Transfer of Care Note    Patient: Karis Suarez    Procedure(s) Performed: Procedure(s) (LRB):  COLONOSCOPY (N/A)    Patient location: PACU    Anesthesia Type: general    Transport from OR: Transported from OR on room air with adequate spontaneous ventilation    Post pain: adequate analgesia    Post assessment: no apparent anesthetic complications and tolerated procedure well    Post vital signs: stable    Level of consciousness: sedated and responds to stimulation    Nausea/Vomiting: no nausea/vomiting    Complications: none    Transfer of care protocol was followed      Last vitals:   Visit Vitals  /82 (BP Location: Right arm, Patient Position: Sitting)   Pulse 75   Temp 36.5 °C (97.7 °F)   Resp 14   SpO2 99%   Breastfeeding? No

## 2019-02-25 NOTE — PLAN OF CARE
Pt dressing self, tolerating well, denies pain. Dr Chow rounds to bedside, reviews outcome and procedure plan of care. Pt escorted to car per wheel chair

## 2019-02-25 NOTE — ANESTHESIA POSTPROCEDURE EVALUATION
Anesthesia Post Evaluation    Patient: Karis Suarez    Procedure(s) Performed: Procedure(s) (LRB):  COLONOSCOPY (N/A)    Final Anesthesia Type: general  Patient location during evaluation: PACU  Patient participation: Yes- Able to Participate  Level of consciousness: awake and alert  Post-procedure vital signs: reviewed and stable  Pain management: adequate  Airway patency: patent  PONV status at discharge: No PONV  Anesthetic complications: no      Cardiovascular status: blood pressure returned to baseline  Respiratory status: unassisted  Hydration status: euvolemic  Follow-up not needed.        Visit Vitals  /82 (BP Location: Right arm, Patient Position: Sitting)   Pulse 78   Temp 36.4 °C (97.6 °F) (Axillary)   Resp 17   SpO2 98%   Breastfeeding? No       Pain/Hill Score: Hill Score: 10 (2/25/2019  8:52 AM)  Modified Hill Score: 20 (2/25/2019  8:52 AM)

## 2019-02-27 NOTE — DISCHARGE SUMMARY
[facility]  Discharge Note  Short Stay    Admit Date: 2/25/2019    Discharge Date and Time: 2/25/2019  9:03 AM     Attending Physician: No att. providers found     Discharge Provider: Jaxson Chow    Diagnoses:  Active Hospital Problems    Diagnosis  POA    *Encounter for screening colonoscopy [Z12.11]  Not Applicable      Resolved Hospital Problems   No resolved problems to display.       Discharged Condition: good    Hospital Course: Patient was admitted for an outpatient procedure and tolerated the procedure well with no complications.    Final Diagnoses: Same as principal problem.    Disposition: Home or Self Care    Follow up/Patient Instructions:    Medications:  Reconciled Home Medications:      Medication List      CONTINUE taking these medications    GABAPENTIN ORAL  Take by mouth.     HYDROcodone-acetaminophen  mg per tablet  Commonly known as:  NORCO  Take 1 tablet by mouth every 8 (eight) hours as needed for Pain.     letrozole 2.5 mg Tab  Commonly known as:  FEMARA  Take 2.5 mg by mouth once daily.     PRAVASTATIN ORAL  Take by mouth.     PriLOSEC 40 MG capsule  Generic drug:  omeprazole  Take 1 capsule by mouth.        STOP taking these medications    sodium,potassium,mag sulfates 17.5-3.13-1.6 gram Solr  Commonly known as:  SUPREP BOWEL PREP KIT          Discharge Procedure Orders   Diet Adult Regular     No driving until:     Order Specific Question Answer Comments   Date: 2/26/2019      Follow-up Information     Jaxson Chow MD In 2 weeks.    Specialty:  General Surgery  Why:  Routine Post Endoscopy Visit  Contact information:  [address]  [facility] [address] [phone]                   Discharge Procedure Orders (must include Diet, Follow-up, Activity):   Discharge Procedure Orders (must include Diet, Follow-up, Activity)   Diet Adult Regular     No driving until:     Order Specific Question Answer Comments   Date: 2/26/2019

## 2019-03-19 ENCOUNTER — OFFICE VISIT (OUTPATIENT)
Dept: SURGERY | Facility: CLINIC | Age: 59
End: 2019-03-19
Payer: MEDICAID

## 2019-03-19 VITALS
TEMPERATURE: 98 F | OXYGEN SATURATION: 94 % | SYSTOLIC BLOOD PRESSURE: 123 MMHG | WEIGHT: 129 LBS | HEART RATE: 95 BPM | DIASTOLIC BLOOD PRESSURE: 86 MMHG | HEIGHT: 66 IN | BODY MASS INDEX: 20.73 KG/M2 | RESPIRATION RATE: 16 BRPM

## 2019-03-19 DIAGNOSIS — K57.30 DIVERTICULOSIS OF COLON: Primary | ICD-10-CM

## 2019-03-19 PROCEDURE — 99213 OFFICE O/P EST LOW 20 MIN: CPT | Mod: S$GLB,,, | Performed by: SURGERY

## 2019-03-19 PROCEDURE — 99213 PR OFFICE/OUTPT VISIT, EST, LEVL III, 20-29 MIN: ICD-10-PCS | Mod: S$GLB,,, | Performed by: SURGERY

## 2019-03-19 RX ORDER — HYDROXYZINE PAMOATE 25 MG/1
CAPSULE ORAL
Refills: 1 | COMMUNITY
Start: 2019-02-08 | End: 2019-08-28

## 2019-03-19 NOTE — PROGRESS NOTES
"Subjective:       Patient ID: Karis Suarez is a 59 y.o. female.    Chief Complaint: Follow-up      HPI:  Ms. Suarez presents today following a recent outpatient colonoscopy  She presents today with no complaints.  No nausea, vomiting, fevers, chills, abdominal pain, diarrhea, constipation, melena, hematochezia, hematemesis, etc.  Appetite is excellent.  Weight is stable.  Activity tolerance is normal.  Overall she feels "great".  Colonoscopy revealed sigmoid diverticulosis and a patchy area of acute inflammation in the cecum.  Biopsy showed focal active colitis without evidence of chronic inflammation.  Findings most consistent with reaction to bowel prep or acute focal infectious colitis.    Colonoscopy and pathology reports were reviewed from 2/25/2019 with the above findings confirmed.        Allergies & Meds:  Review of patient's allergies indicates:  No Known Allergies    Current Outpatient Medications   Medication Sig Dispense Refill    GABAPENTIN ORAL Take by mouth.      HYDROcodone-acetaminophen (NORCO)  mg per tablet Take 1 tablet by mouth every 8 (eight) hours as needed for Pain. 10 tablet 0    hydrOXYzine pamoate (VISTARIL) 25 MG Cap TK 1 C PO QPM PRF SLEEP OR ANXIETY. WILL CAUSE DROWSINESS  1    letrozole (FEMARA) 2.5 mg Tab Take 2.5 mg by mouth once daily.      omeprazole (PRILOSEC) 40 MG capsule Take 1 capsule by mouth.      pravastatin sodium (PRAVASTATIN ORAL) Take by mouth.       No current facility-administered medications for this visit.        PMFSHx:  Past Medical History:   Diagnosis Date    AVN (avascular necrosis of bone)     Blood clot in vein     Breast cancer     Hypercholesteremia     Pulmonary embolism        Past Surgical History:   Procedure Laterality Date    COLONOSCOPY N/A 2/25/2019    [facility]    FOOT SURGERY Left     REMOVAL OF VASCULAR ACCESS PORT      TUBAL LIGATION         Family History   Problem Relation Age of Onset "    Breast cancer Mother     Skin cancer Mother     Skin cancer Brother        Social History     Tobacco Use    Smoking status: Current Every Day Smoker     Packs/day: 0.50     Types: Cigarettes    Smokeless tobacco: Never Used   Substance Use Topics    Alcohol use: Yes     Comment: Occasional    Drug use: No       Review of Systems   Constitutional: Negative for appetite change, chills, fatigue, fever and unexpected weight change.   HENT: Negative for congestion, dental problem, ear pain, mouth sores, postnasal drip, rhinorrhea, sore throat, tinnitus, trouble swallowing and voice change.    Eyes: Negative for photophobia, pain, discharge and visual disturbance.   Respiratory: Negative for cough, chest tightness, shortness of breath and wheezing.    Cardiovascular: Negative for chest pain, palpitations and leg swelling.   Gastrointestinal: Negative for abdominal pain, blood in stool, constipation, diarrhea, nausea and vomiting.   Endocrine: Negative for cold intolerance, heat intolerance, polydipsia, polyphagia and polyuria.   Genitourinary: Negative for difficulty urinating, dysuria, flank pain, frequency, hematuria and urgency.   Musculoskeletal: Negative for arthralgias, joint swelling and myalgias.   Skin: Negative for color change and rash.   Allergic/Immunologic: Negative for immunocompromised state.   Neurological: Negative for dizziness, tremors, seizures, syncope, speech difficulty, weakness, numbness and headaches.   Hematological: Negative for adenopathy. Does not bruise/bleed easily.   Psychiatric/Behavioral: Negative for agitation, confusion, hallucinations, self-injury and suicidal ideas. The patient is not nervous/anxious.        Objective:      Physical Exam   Constitutional: She appears well-developed and well-nourished.  Non-toxic appearance. She does not appear ill. No distress.   Cardiovascular: Normal rate, regular rhythm and normal heart sounds. PMI is not displaced. Exam reveals no  gallop.   No murmur heard.  Pulmonary/Chest: Effort normal and breath sounds normal. No accessory muscle usage. No tachypnea. No respiratory distress.   Abdominal: Soft. Normal appearance and bowel sounds are normal. There is no tenderness. No hernia.   Skin: Skin is warm, dry and intact. No rash noted.         Test Results:  See above    Assessment:       1. Diverticulosis of colon        Plan:   Diverticulosis of colon        Follow-up for any concerns or questions as needed, resume care with PCP.    Counseling/Medical Decision Making:  Karis Suarez and LIZ had a long conversation regarding the diagnosis of diverticulosis and diverticulitis. The [employer] publication pertaining to the subject was provided to her as well. The complications of diverticulosis including diverticulitis and hemorrhage were discussed in great detail. The signs and symptoms of these complications were explained explicitly including but not limited to: bleeding, abdominal pain, fever, nausea, vomiting, diarrhea, constipation, etc. She was instructed to seek immediate medical attention should any of the signs or symptoms develop. The importance of avoiding constipation was explained. The benefits of a 40 g high fiber diet, fiber supplements, stool softeners, and increased free water intake were also explained. Questions were solicited and answered. Entire conversation was held in layman's terms. At the conclusion of the conversation she voiced complete understanding of all we discussed and satisfaction that all questions were fully answered.  Repeat screening colonoscopy recommended in 10 years.    Total face-to-face encountered time was 15 minutes, 10 minutes spent counseling the patient as outlined/summarized above.

## 2019-04-30 DIAGNOSIS — N64.4 MASTODYNIA: Primary | ICD-10-CM

## 2019-05-06 ENCOUNTER — HOSPITAL ENCOUNTER (OUTPATIENT)
Dept: RADIOLOGY | Facility: HOSPITAL | Age: 59
Discharge: HOME | End: 2019-05-06
Attending: NURSE PRACTITIONER
Payer: MEDICARE

## 2019-05-06 DIAGNOSIS — N64.4 MASTODYNIA: ICD-10-CM

## 2019-05-06 PROCEDURE — 76642 ULTRASOUND BREAST LIMITED: CPT | Mod: 26,50,, | Performed by: RADIOLOGY

## 2019-05-06 PROCEDURE — 76642 US BREAST BILATERAL LIMITED: ICD-10-PCS | Mod: 26,50,, | Performed by: RADIOLOGY

## 2019-05-06 PROCEDURE — 76642 ULTRASOUND BREAST LIMITED: CPT | Mod: TC,50

## 2019-08-27 ENCOUNTER — HOSPITAL ENCOUNTER (OUTPATIENT)
Dept: RADIOLOGY | Facility: HOSPITAL | Age: 59
Discharge: HOME | End: 2019-08-27
Attending: NURSE PRACTITIONER
Payer: MEDICARE

## 2019-08-27 DIAGNOSIS — M19.041 PRIMARY OSTEOARTHRITIS OF RIGHT HAND: Primary | ICD-10-CM

## 2019-08-27 DIAGNOSIS — M19.041 PRIMARY OSTEOARTHRITIS OF RIGHT HAND: ICD-10-CM

## 2019-08-27 PROCEDURE — 73130 X-RAY EXAM OF HAND: CPT | Mod: 26,RT,, | Performed by: RADIOLOGY

## 2019-08-27 PROCEDURE — 73130 XR HAND COMPLETE 3 VIEW RIGHT: ICD-10-PCS | Mod: 26,RT,, | Performed by: RADIOLOGY

## 2019-08-27 PROCEDURE — 73130 X-RAY EXAM OF HAND: CPT | Mod: TC,FY,RT

## 2019-08-28 ENCOUNTER — OFFICE VISIT (OUTPATIENT)
Dept: GASTROENTEROLOGY | Facility: CLINIC | Age: 59
End: 2019-08-28
Payer: MEDICAID

## 2019-08-28 ENCOUNTER — LAB VISIT (OUTPATIENT)
Dept: LAB | Facility: HOSPITAL | Age: 59
End: 2019-08-28
Attending: INTERNAL MEDICINE
Payer: MEDICARE

## 2019-08-28 VITALS
OXYGEN SATURATION: 98 % | RESPIRATION RATE: 18 BRPM | DIASTOLIC BLOOD PRESSURE: 76 MMHG | HEIGHT: 66 IN | BODY MASS INDEX: 20.73 KG/M2 | HEART RATE: 91 BPM | WEIGHT: 129 LBS | SYSTOLIC BLOOD PRESSURE: 123 MMHG

## 2019-08-28 DIAGNOSIS — K57.30 DIVERTICULOSIS OF LARGE INTESTINE WITHOUT HEMORRHAGE: ICD-10-CM

## 2019-08-28 DIAGNOSIS — R19.7 DIARRHEA, UNSPECIFIED TYPE: ICD-10-CM

## 2019-08-28 DIAGNOSIS — K57.30 DIVERTICULA OF COLON: ICD-10-CM

## 2019-08-28 DIAGNOSIS — R19.7 DIARRHEA, UNSPECIFIED TYPE: Primary | ICD-10-CM

## 2019-08-28 DIAGNOSIS — K58.0 IRRITABLE BOWEL SYNDROME WITH DIARRHEA: ICD-10-CM

## 2019-08-28 LAB — ERYTHROCYTE [SEDIMENTATION RATE] IN BLOOD BY WESTERGREN METHOD: 45 MM/HR (ref 0–20)

## 2019-08-28 PROCEDURE — 36415 COLL VENOUS BLD VENIPUNCTURE: CPT

## 2019-08-28 PROCEDURE — 99213 OFFICE O/P EST LOW 20 MIN: CPT | Mod: PBBFAC,PN | Performed by: INTERNAL MEDICINE

## 2019-08-28 PROCEDURE — 99999 PR PBB SHADOW E&M-EST. PATIENT-LVL III: ICD-10-PCS | Mod: PBBFAC,,, | Performed by: INTERNAL MEDICINE

## 2019-08-28 PROCEDURE — 85651 RBC SED RATE NONAUTOMATED: CPT

## 2019-08-28 PROCEDURE — 99999 PR PBB SHADOW E&M-EST. PATIENT-LVL III: CPT | Mod: PBBFAC,,, | Performed by: INTERNAL MEDICINE

## 2019-08-28 PROCEDURE — 99203 PR OFFICE/OUTPT VISIT, NEW, LEVL III, 30-44 MIN: ICD-10-PCS | Mod: S$PBB,,, | Performed by: INTERNAL MEDICINE

## 2019-08-28 PROCEDURE — 99203 OFFICE O/P NEW LOW 30 MIN: CPT | Mod: S$PBB,,, | Performed by: INTERNAL MEDICINE

## 2019-08-28 RX ORDER — SERTRALINE HYDROCHLORIDE 50 MG/1
TABLET, FILM COATED ORAL
Refills: 0 | COMMUNITY
Start: 2019-07-26

## 2019-08-28 RX ORDER — PRAVASTATIN SODIUM 20 MG/1
20 TABLET ORAL DAILY
Refills: 2 | COMMUNITY
Start: 2019-06-03

## 2019-08-28 RX ORDER — MONTELUKAST SODIUM 4 MG/1
2 TABLET, CHEWABLE ORAL DAILY
Qty: 60 TABLET | Refills: 11 | Status: SHIPPED | OUTPATIENT
Start: 2019-08-28 | End: 2019-09-04

## 2019-08-28 RX ORDER — HYDROCODONE BITARTRATE AND ACETAMINOPHEN 10; 300 MG/1; MG/1
TABLET ORAL
COMMUNITY
End: 2019-10-14

## 2019-08-28 NOTE — PROGRESS NOTES
Subjective:       Patient ID: Karis Suarez is a 59 y.o. female.    Chief Complaint: Diarrhea (w gas)    She is here for diarrhea 2 weeks duration.  She cannot pinpoint a precipitating factor.  She use the same medications and is ingested the same diet and has not been around ill persons or traveling in the area.  She was put on Flagyl for 2 weeks without improvement.  She describes the diarrhea as semi solid as  chocolate liquid .  She denies incontinence.  She denies nocturnal.  She denies fever chills. She generally has had normal bowel movements.  Her colonoscopy 3 months ago revealed diverticulosis with 1 area of hyperemia which was nonspecific.  She denies hematemesis hematochezia jaundice or bleeding.  She used to use multiple BC powders.  She has had pyrosis and dyspepsia but she denies dysphagia aspiration.  She only use an occasional BC Powder.  She does have chronic hip pain.      Allergies:  Review of patient's allergies indicates:  No Known Allergies    Medications:    Current Outpatient Medications:     HYDROcodone-acetaminophen  mg Tab, Take by mouth., Disp: , Rfl:     letrozole (FEMARA) 2.5 mg Tab, Take 2.5 mg by mouth once daily., Disp: , Rfl:     omeprazole (PRILOSEC) 40 MG capsule, Take 1 capsule by mouth., Disp: , Rfl:     pravastatin (PRAVACHOL) 20 MG tablet, Take 20 mg by mouth once daily., Disp: , Rfl: 2    sertraline (ZOLOFT) 50 MG tablet, TAKE 1 2 (ONE HALF) TABLET BY MOUTH ONCE DAILY FOR 7 DAYS THEN INCREASE TO ONE TABLET ONCE DAILY THEREAFTER, Disp: , Rfl: 0    Past Medical History:   Diagnosis Date    AVN (avascular necrosis of bone)     Blood clot in vein     Breast cancer     Hypercholesteremia     Pulmonary embolism        Past Surgical History:   Procedure Laterality Date    COLONOSCOPY N/A 2/25/2019    [facility]    FOOT SURGERY Left     REMOVAL OF VASCULAR ACCESS PORT      TUBAL LIGATION           Review of Systems    Constitutional: Negative for appetite change, fever and unexpected weight change.   HENT: Negative for trouble swallowing.         No jaundice.   Respiratory: Negative for cough, shortness of breath and wheezing.         She is a daily cigarette smoker and has occasional sputum production.  She denies aspiration.  Because of her hip pain she has not been as physically active.   Cardiovascular: Negative for chest pain.        Hyperlipidemia is well controlled.  She denies exertional chest pain or rhythm disturbance.   Gastrointestinal: Positive for diarrhea. Negative for abdominal distention, abdominal pain, anal bleeding, blood in stool, constipation, nausea and rectal pain.        She describes the diarrhea is a couple times per week.  Sometimes associated with cramping which signals the urge for defecation.  She has had occasional urgency.  She denies nocturnal, incontinence or bleeding.  She denies hematemesis or hematochezia.  She denies dysphagia aspiration.   Musculoskeletal: Positive for arthralgias. Negative for back pain and neck pain.        She has had foot surgeries.  She has right hip pain is scheduled for replacement surgery.  She states she can ambulate with a cane without falling.  She can perform her usual activities.   Skin: Negative for pallor and rash.   Neurological: Negative for dizziness, seizures, syncope, speech difficulty, weakness and numbness.   Hematological: Negative for adenopathy.   Psychiatric/Behavioral: Negative for confusion.       Objective:      Physical Exam   Constitutional: She is oriented to person, place, and time. She appears well-developed and well-nourished.   Well-nourished well-hydrated elderly nonicteric white female   HENT:   Head: Normocephalic.   Eyes: Pupils are equal, round, and reactive to light. EOM are normal.   Neck: Normal range of motion. Neck supple. No tracheal deviation present. No thyromegaly present.   Cardiovascular: Normal rate, regular rhythm and  normal heart sounds.   Pulmonary/Chest: Effort normal and breath sounds normal.   Abdominal: Soft. Bowel sounds are normal. She exhibits no distension and no mass. There is no tenderness. There is no rebound and no guarding. No hernia.   The abdomen is soft without tenderness masses organomegaly.  Bowel sounds are normal.   Musculoskeletal:   She can ambulate with minimal assistance with a cane.  She can get on the examination table with minimal help.   Lymphadenopathy:     She has no cervical adenopathy.   Neurological: She is alert and oriented to person, place, and time. No cranial nerve deficit.   Skin: Skin is warm and dry.   Psychiatric: She has a normal mood and affect. Her behavior is normal.   Vitals reviewed.        Plan:       Diarrhea, unspecified type  -     Gastrointestinal Pathogens Panel, PCR; Future; Expected date: 08/28/2019  -     ESR (SEDIMENTATION RATE, MANUAL); Future; Expected date: 08/28/2019    Diverticula of colon    Diverticulosis of large intestine without hemorrhage    Irritable bowel syndrome with diarrhea

## 2019-08-28 NOTE — LETTER
August 28, 2019      Olivia Ruth, [address]  [place] [address]           [facility]  [address], [address]  [place] [address]  Phone: [phone]  Fax: [phone]          Patient: Karis Suarez   MR Number: [id number]   YOB: 1960   Date of Visit: 8/28/2019       Dear Dr. Olivia Ruth:    Thank you for referring Karis Suarez to me for evaluation. Attached you will find relevant portions of my assessment and plan of care.    If you have questions, please do not hesitate to call me. I look forward to following Karis Suarez along with you.    Sincerely,    Rudy Vigil MD    Enclosure  CC:  No Recipients    If you would like to receive this communication electronically, please contact [email or web address] or [phone] to request more information on [employer] Link access.    For providers and/or their staff who would like to refer a patient to [facility], please contact us through our one-stop-shop provider referral line, [facility], at [phone].    If you feel you have received this communication in error or would no longer like to receive these types of communications, please e-mail [email or web address]

## 2019-08-28 NOTE — PATIENT INSTRUCTIONS
She will continue medications.  She is started on the Colestid.  She has 2 weeks of diarrhea without a known precipitating factor.  She was put on the Flagyl.  The initial stool studies were negative.  Repeat stool studies will be obtained. She has a history of recent colonoscopy with the findings of diverticulosis.  She denies bleeding or jaundice.  She describes the bowel movements is a couple times a day as chocolate liquid.  She denies cardiopulmonary symptoms but is a cigarette smoker.  She consumes alcohol 2 times per week.  She has had occasional pyrosis.  She has decreased her BC powders to only an occasional 1 for the pain. She is scheduled for right hip pain.  It was postponed because of the diarrhea.  Three months ago at the time of colonoscopy she was symptom-free.  She will make a food diary and attempt to pinpoint an offending food.  She will add more fiber to the diet.

## 2019-08-29 ENCOUNTER — LAB VISIT (OUTPATIENT)
Dept: LAB | Facility: HOSPITAL | Age: 59
End: 2019-08-29
Attending: INTERNAL MEDICINE
Payer: MEDICARE

## 2019-08-29 DIAGNOSIS — R19.7 DIARRHEA, UNSPECIFIED TYPE: ICD-10-CM

## 2019-08-29 PROCEDURE — 87507 IADNA-DNA/RNA PROBE TQ 12-25: CPT

## 2019-09-03 DIAGNOSIS — R19.7 DIARRHEA, UNSPECIFIED TYPE: Primary | ICD-10-CM

## 2019-09-03 LAB
GPP - ADENOVIRUS 40/41: NOT DETECTED
GPP - CAMPYLOBACTER: NOT DETECTED
GPP - CLOSTRIDIUM DIFFICILE TOXIN A/B: NOT DETECTED
GPP - CRYPTOSPORIDIUM: NOT DETECTED
GPP - E COLI O157: NOT DETECTED
GPP - ENTAMOEBA HISTOLYTICA: NOT DETECTED
GPP - ENTEROTOXIGENIC E COLI (ETEC): NOT DETECTED
GPP - GIARDIA LAMBLIA: NOT DETECTED
GPP - NOROVIRUS GI/GII: NOT DETECTED
GPP - ROTAVIRUS A: NOT DETECTED
GPP - SALMONELLA: NOT DETECTED
GPP - SHIGELLA: NOT DETECTED
GPP - VIBRIO CHOLERA: NOT DETECTED
GPP - YERSINIA ENTEROCOLITICA: NOT DETECTED
LACTATE PLASV-SCNC: NOT DETECTED MMOL/L

## 2019-09-03 NOTE — TELEPHONE ENCOUNTER
----- Message from Anjelica Magallanes sent at 9/3/2019  8:36 AM CDT -----  Type: Needs Medical Advice    Who Called:  Patient  Best Call Back Number: [phone]  Additional Information: Patient stated she was given medication for stomach irritation/stated that pills are too large to swallow/needs something else ordered/please call patient back to advise.

## 2019-09-04 RX ORDER — CHOLESTYRAMINE 4 G/9G
4 POWDER, FOR SUSPENSION ORAL DAILY
Qty: 1 CAN | Refills: 1 | Status: SHIPPED | OUTPATIENT
Start: 2019-09-04 | End: 2019-12-11 | Stop reason: SDUPTHER

## 2019-09-09 ENCOUNTER — TELEPHONE (OUTPATIENT)
Dept: GASTROENTEROLOGY | Facility: CLINIC | Age: 59
End: 2019-09-09

## 2019-09-09 NOTE — TELEPHONE ENCOUNTER
Spoke with pt. She stated she has been having frequent, loose BMs. She stated she has had diarrhea 4-5 times just overnight. Pt stated she thought she was having diarrhea due to the colestid. Pt stated she just stopped taking the Colestid yesterday. Pt told to take some Imodium, and MD would be advised.

## 2019-09-09 NOTE — TELEPHONE ENCOUNTER
----- Message from Cherrie Santoro sent at 9/9/2019  9:56 AM CDT -----  Contact: Yesenia pt  Type: Needs Medical Advice    Who Called:  Yesenia  Symptoms (please be specific):  Had diarrhea since being put on meds  How long has patient had these symptoms:  Since being prescribed colestipol 1gm  Pharmacy name and phone #:      [id number] [place] [address] [address] [facility] [address]  [address] [place] [address]  Phone: [phone] Fax: [phone]      Best Call Back Number: [phone]  Additional Information: Pls call pt regarding the med/med is not listed on pt's chart

## 2019-09-12 ENCOUNTER — LAB VISIT (OUTPATIENT)
Dept: LAB | Facility: HOSPITAL | Age: 59
End: 2019-09-12
Attending: INTERNAL MEDICINE
Payer: MEDICAID

## 2019-09-12 ENCOUNTER — TELEPHONE (OUTPATIENT)
Dept: GASTROENTEROLOGY | Facility: CLINIC | Age: 59
End: 2019-09-12

## 2019-09-12 DIAGNOSIS — R19.7 DIARRHEA, UNSPECIFIED TYPE: ICD-10-CM

## 2019-09-12 DIAGNOSIS — R19.7 DIARRHEA, UNSPECIFIED TYPE: Primary | ICD-10-CM

## 2019-09-12 PROCEDURE — 83516 IMMUNOASSAY NONANTIBODY: CPT | Mod: 59

## 2019-09-12 PROCEDURE — 36415 COLL VENOUS BLD VENIPUNCTURE: CPT

## 2019-09-12 NOTE — TELEPHONE ENCOUNTER
Per v/o by Dr. Rudy Vigil, pt is to have celiac panel obtained, and may double the Colestid.    Called pt and explained MD ordered labs, and approved pt to increase Colestid. Pt verbalized understanding.

## 2019-09-12 NOTE — TELEPHONE ENCOUNTER
----- Message from Sarai  sent at 9/12/2019 10:51 AM CDT -----  Contact: patient  Type: Needs Medical Advice    Who Called:  patient  Best Call Back Number:   Additional Information: Requesting a call back from Nurse, regarding  Still having issues and patient surgery is for Tuesday and does not want to cancel it for a third time, please have Nurse or  call back for Advice

## 2019-09-16 LAB
GLIADIN PEPTIDE IGA SER-ACNC: 8 UNITS
GLIADIN PEPTIDE IGG SER-ACNC: 4 UNITS
IGA SERPL-MCNC: 343 MG/DL (ref 70–400)
TTG IGA SER-ACNC: 8 UNITS
TTG IGG SER-ACNC: 4 UNITS

## 2019-09-18 ENCOUNTER — TELEPHONE (OUTPATIENT)
Dept: GASTROENTEROLOGY | Facility: CLINIC | Age: 59
End: 2019-09-18

## 2019-09-18 NOTE — TELEPHONE ENCOUNTER
----- Message from Carmen Kemp sent at 9/18/2019 10:39 AM CDT -----  Contact: self   Patient need to speak to nurse regarding she severe diarrhea for 3 weeks,and the medicine she was put on is not working       Patient states her procedure for hip replacement was cancel yesterday due to diarrhea per patient       Patient states something came back in her lab (urine )      Please call to advice [phone] (home)

## 2019-09-19 ENCOUNTER — OFFICE VISIT (OUTPATIENT)
Dept: GASTROENTEROLOGY | Facility: CLINIC | Age: 59
End: 2019-09-19
Payer: MEDICAID

## 2019-09-19 VITALS
TEMPERATURE: 98 F | BODY MASS INDEX: 20.33 KG/M2 | HEIGHT: 65 IN | DIASTOLIC BLOOD PRESSURE: 76 MMHG | RESPIRATION RATE: 16 BRPM | WEIGHT: 122 LBS | SYSTOLIC BLOOD PRESSURE: 121 MMHG | HEART RATE: 92 BPM

## 2019-09-19 DIAGNOSIS — K57.30 DIVERTICULOSIS OF LARGE INTESTINE WITHOUT HEMORRHAGE: ICD-10-CM

## 2019-09-19 DIAGNOSIS — R19.7 DIARRHEA, UNSPECIFIED TYPE: Primary | ICD-10-CM

## 2019-09-19 PROCEDURE — 99213 OFFICE O/P EST LOW 20 MIN: CPT | Mod: PBBFAC,PN | Performed by: INTERNAL MEDICINE

## 2019-09-19 PROCEDURE — 99999 PR PBB SHADOW E&M-EST. PATIENT-LVL III: CPT | Mod: PBBFAC,,, | Performed by: INTERNAL MEDICINE

## 2019-09-19 PROCEDURE — 99213 PR OFFICE/OUTPT VISIT, EST, LEVL III, 20-29 MIN: ICD-10-PCS | Mod: S$PBB,,, | Performed by: INTERNAL MEDICINE

## 2019-09-19 PROCEDURE — 99213 OFFICE O/P EST LOW 20 MIN: CPT | Mod: S$PBB,,, | Performed by: INTERNAL MEDICINE

## 2019-09-19 PROCEDURE — 99999 PR PBB SHADOW E&M-EST. PATIENT-LVL III: ICD-10-PCS | Mod: PBBFAC,,, | Performed by: INTERNAL MEDICINE

## 2019-09-19 RX ORDER — DIPHENOXYLATE HYDROCHLORIDE AND ATROPINE SULFATE 2.5; .025 MG/1; MG/1
1 TABLET ORAL EVERY 6 HOURS PRN
Qty: 100 TABLET | Refills: 0 | Status: SHIPPED | OUTPATIENT
Start: 2019-09-19 | End: 2019-09-29

## 2019-09-19 NOTE — PATIENT INSTRUCTIONS
She will continue her current medications with stop the Zoloft and the Colestid.  She was started on the Lomotil.  She was scheduled to have hip surgery but it was canceled because she had abnormal labs and urinalysis.  I have requested the records from [facility].  Repeat stool studies will be obtained.

## 2019-09-19 NOTE — LETTER
September 19, 2019      Olivia Ruth, [address]  [place] [address]           [facility]  [place] [facility]  [address], [address]  [place] [address]  Phone: [phone]  Fax: [phone]          Patient: Karis Suarez   MR Number: [id number]   YOB: 1960   Date of Visit: 9/19/2019       Dear Dr. Olivia Ruth:    Thank you for referring Karis Suarez to me for evaluation. Attached you will find relevant portions of my assessment and plan of care.    If you have questions, please do not hesitate to call me. I look forward to following Karis Suarez along with you.    Sincerely,    Rudy Vigil MD    Enclosure  CC:  No Recipients    If you would like to receive this communication electronically, please contact [email or web address] or [phone] to request more information on [employer] Link access.    For providers and/or their staff who would like to refer a patient to [facility], please contact us through our one-stop-shop provider referral line, [facility], at [phone].    If you feel you have received this communication in error or would no longer like to receive these types of communications, please e-mail [email or web address]

## 2019-09-19 NOTE — PROGRESS NOTES
Subjective:       Patient ID: Karis Suarez is a 59 y.o. female.    Chief Complaint: Diarrhea (x3 weeks ) and Abdominal Pain    She still has diarrhea.  She is having some incontinence.  In January her colonoscopy revealed diverticulosis and an area of hyperemia in the cecum which showed mild hyperemia.  She states the diarrhea started approximately 2 or 3 weeks ago.  The only new medicine was the Zoloft.  The other medicine she has been taking for quite a while.  She has a history of breast cancer but she is in remission.  Last week she was scheduled for surgery on the right hip for replacement and was told her labs and urinalysis were abnormal.  I have requested the labs.  Her surgery was canceled.  This morning she had diarrhea with incontinence.  Repeat stool studies will be obtained. She denies abdominal pain. She denies hematemesis hematochezia fever chills jaundice or bleeding.      Allergies:  Review of patient's allergies indicates:  No Known Allergies    Medications:    Current Outpatient Medications:     cholestyramine, with sugar, 4 gram Powd, Take 4 g by mouth once daily., Disp: 1 Can, Rfl: 1    HYDROcodone-acetaminophen  mg Tab, Take by mouth., Disp: , Rfl:     letrozole (FEMARA) 2.5 mg Tab, Take 2.5 mg by mouth once daily., Disp: , Rfl:     omeprazole (PRILOSEC) 40 MG capsule, Take 1 capsule by mouth., Disp: , Rfl:     pravastatin (PRAVACHOL) 20 MG tablet, Take 20 mg by mouth once daily., Disp: , Rfl: 2    sertraline (ZOLOFT) 50 MG tablet, TAKE 1 2 (ONE HALF) TABLET BY MOUTH ONCE DAILY FOR 7 DAYS THEN INCREASE TO ONE TABLET ONCE DAILY THEREAFTER, Disp: , Rfl: 0    diphenoxylate-atropine 2.5-0.025 mg (LOMOTIL) 2.5-0.025 mg per tablet, Take 1 tablet by mouth every 6 (six) hours as needed for Diarrhea., Disp: 100 tablet, Rfl: 0    Past Medical History:   Diagnosis Date    AVN (avascular necrosis of bone)     Blood clot in vein     Breast cancer     Hypercholesteremia     Pulmonary  embolism        Past Surgical History:   Procedure Laterality Date    COLONOSCOPY N/A 2/25/2019    Performed by Jaxson Chow [facility] at [facility]    FOOT SURGERY Left     REMOVAL OF VASCULAR ACCESS PORT      TUBAL LIGATION           Review of Systems   Constitutional: Negative for appetite change, fever and unexpected weight change.   HENT: Negative for trouble swallowing.         No jaundice.   Respiratory: Negative for cough, shortness of breath and wheezing.         She is a daily cigarette smoker.  She has occasional coughing but denies aspiration or chronic sputum production.  She denies dyspnea on exertion but she is not particularly physically active because of her arthritic hips.   Cardiovascular: Negative for chest pain.        She denies cardiopulmonary symptoms.  She denies exertional chest pain or rhythm disturbance   Gastrointestinal: Positive for diarrhea. Negative for abdominal distention, abdominal pain, anal bleeding, blood in stool, constipation, nausea and rectal pain.   Musculoskeletal: Positive for arthralgias. Negative for back pain and neck pain.        She ambulates with a cane.  She has severe arthritis particularly the right hip and was scheduled for surgery.  Her hips back shoulders and knees are painful from the arthritis.  She denies swollen joints.  She has seen the orthopedist.   Skin: Negative for pallor and rash.   Neurological: Negative for dizziness, seizures, syncope, speech difficulty, weakness and numbness.   Hematological: Negative for adenopathy.   Psychiatric/Behavioral: Negative for confusion.       Objective:      Physical Exam   Constitutional: She is oriented to person, place, and time. She appears well-nourished.   Thin elderly nonicteric white female.  She appears to be well-hydrated.   HENT:   Head: Normocephalic.   Eyes: Pupils are equal, round, and reactive to light. EOM are normal.   Neck: Normal range of motion. Neck supple. No tracheal deviation present. No  thyromegaly present.   Cardiovascular: Normal rate, regular rhythm and normal heart sounds.   Pulmonary/Chest: Effort normal and breath sounds normal.   Abdominal: Soft. Bowel sounds are normal. She exhibits no distension and no mass. There is no tenderness. There is no rebound and no guarding.   Abdomen is soft without tenderness masses organomegaly.  Bowel sounds are normal.   Musculoskeletal: Normal range of motion.   She ambulates slowly with the cane.  She can get from the sitting to the standing position without difficulty.  She can get on the examination table with minimal assistance.   Lymphadenopathy:     She has no cervical adenopathy.   Neurological: She is alert and oriented to person, place, and time. No cranial nerve deficit.   Skin: Skin is warm and dry.   Psychiatric: She has a normal mood and affect. Her behavior is normal.   Vitals reviewed.        Plan:       Diarrhea, unspecified type    Diverticulosis of large intestine without hemorrhage    Other orders  -     Gastrointestinal Pathogens Panel, PCR; Future; Expected date: 09/19/2019  -     diphenoxylate-atropine 2.5-0.025 mg (LOMOTIL) 2.5-0.025 mg per tablet; Take 1 tablet by mouth every 6 (six) hours as needed for Diarrhea.  Dispense: 100 tablet; Refill: 0     She will stop the Colestid and the Zoloft.  She can continue her other medications.  She will have repeat stool studies today.  She started on the Lomotil.  [facility].

## 2019-09-20 ENCOUNTER — LAB VISIT (OUTPATIENT)
Dept: LAB | Facility: HOSPITAL | Age: 59
End: 2019-09-20
Attending: INTERNAL MEDICINE
Payer: MEDICAID

## 2019-09-20 PROCEDURE — 87507 IADNA-DNA/RNA PROBE TQ 12-25: CPT

## 2019-09-26 ENCOUNTER — OFFICE VISIT (OUTPATIENT)
Dept: GASTROENTEROLOGY | Facility: CLINIC | Age: 59
End: 2019-09-26
Payer: MEDICAID

## 2019-09-26 ENCOUNTER — LAB VISIT (OUTPATIENT)
Dept: LAB | Facility: HOSPITAL | Age: 59
End: 2019-09-26
Attending: INTERNAL MEDICINE
Payer: MEDICAID

## 2019-09-26 VITALS
HEIGHT: 65 IN | HEART RATE: 101 BPM | OXYGEN SATURATION: 99 % | DIASTOLIC BLOOD PRESSURE: 81 MMHG | SYSTOLIC BLOOD PRESSURE: 120 MMHG | WEIGHT: 120 LBS | RESPIRATION RATE: 18 BRPM | BODY MASS INDEX: 19.99 KG/M2

## 2019-09-26 DIAGNOSIS — R19.7 DIARRHEA, UNSPECIFIED TYPE: Primary | ICD-10-CM

## 2019-09-26 DIAGNOSIS — R10.84 GENERALIZED ABDOMINAL PAIN: ICD-10-CM

## 2019-09-26 DIAGNOSIS — K57.30 DIVERTICULOSIS OF LARGE INTESTINE WITHOUT HEMORRHAGE: ICD-10-CM

## 2019-09-26 DIAGNOSIS — R19.7 DIARRHEA, UNSPECIFIED TYPE: ICD-10-CM

## 2019-09-26 DIAGNOSIS — K57.30 DIVERTICULA OF COLON: ICD-10-CM

## 2019-09-26 DIAGNOSIS — K58.0 IRRITABLE BOWEL SYNDROME WITH DIARRHEA: ICD-10-CM

## 2019-09-26 DIAGNOSIS — E53.8 B12 DEFICIENCY: ICD-10-CM

## 2019-09-26 LAB
BASOPHILS # BLD AUTO: 0.07 K/UL (ref 0–0.2)
BASOPHILS NFR BLD: 0.8 % (ref 0–1.9)
CREAT SERPL-MCNC: 0.6 MG/DL (ref 0.5–1.4)
DIFFERENTIAL METHOD: ABNORMAL
EOSINOPHIL # BLD AUTO: 0.5 K/UL (ref 0–0.5)
EOSINOPHIL NFR BLD: 6.2 % (ref 0–8)
ERYTHROCYTE [DISTWIDTH] IN BLOOD BY AUTOMATED COUNT: 13.8 % (ref 11.5–14.5)
EST. GFR  (AFRICAN AMERICAN): >60 ML/MIN/1.73 M^2
EST. GFR  (NON AFRICAN AMERICAN): >60 ML/MIN/1.73 M^2
FERRITIN SERPL-MCNC: 86 NG/ML (ref 20–300)
FOLATE SERPL-MCNC: 11.3 NG/ML (ref 4–24)
HCT VFR BLD AUTO: 41.6 % (ref 37–48.5)
HGB BLD-MCNC: 14.4 G/DL (ref 12–16)
IMM GRANULOCYTES # BLD AUTO: 0.01 K/UL (ref 0–0.04)
IMM GRANULOCYTES NFR BLD AUTO: 0.1 % (ref 0–0.5)
IRON SERPL-MCNC: 49 UG/DL (ref 30–160)
LYMPHOCYTES # BLD AUTO: 2.1 K/UL (ref 1–4.8)
LYMPHOCYTES NFR BLD: 23.9 % (ref 18–48)
MCH RBC QN AUTO: 33.8 PG (ref 27–31)
MCHC RBC AUTO-ENTMCNC: 34.6 G/DL (ref 32–36)
MCV RBC AUTO: 98 FL (ref 82–98)
MONOCYTES # BLD AUTO: 0.9 K/UL (ref 0.3–1)
MONOCYTES NFR BLD: 10.2 % (ref 4–15)
NEUTROPHILS # BLD AUTO: 5.1 K/UL (ref 1.8–7.7)
NEUTROPHILS NFR BLD: 58.8 % (ref 38–73)
NRBC BLD-RTO: 0 /100 WBC
PLATELET # BLD AUTO: 371 K/UL (ref 150–350)
PMV BLD AUTO: 10.5 FL (ref 9.2–12.9)
RBC # BLD AUTO: 4.26 M/UL (ref 4–5.4)
WBC # BLD AUTO: 8.74 K/UL (ref 3.9–12.7)

## 2019-09-26 PROCEDURE — 99999 PR PBB SHADOW E&M-EST. PATIENT-LVL III: CPT | Mod: PBBFAC,,, | Performed by: INTERNAL MEDICINE

## 2019-09-26 PROCEDURE — 99213 OFFICE O/P EST LOW 20 MIN: CPT | Mod: PBBFAC,PN | Performed by: INTERNAL MEDICINE

## 2019-09-26 PROCEDURE — 99213 OFFICE O/P EST LOW 20 MIN: CPT | Mod: S$PBB,,, | Performed by: INTERNAL MEDICINE

## 2019-09-26 PROCEDURE — 83921 ORGANIC ACID SINGLE QUANT: CPT

## 2019-09-26 PROCEDURE — 99999 PR PBB SHADOW E&M-EST. PATIENT-LVL III: ICD-10-PCS | Mod: PBBFAC,,, | Performed by: INTERNAL MEDICINE

## 2019-09-26 PROCEDURE — 99213 PR OFFICE/OUTPT VISIT, EST, LEVL III, 20-29 MIN: ICD-10-PCS | Mod: S$PBB,,, | Performed by: INTERNAL MEDICINE

## 2019-09-26 PROCEDURE — 36415 COLL VENOUS BLD VENIPUNCTURE: CPT

## 2019-09-26 PROCEDURE — 82728 ASSAY OF FERRITIN: CPT

## 2019-09-26 PROCEDURE — 82746 ASSAY OF FOLIC ACID SERUM: CPT

## 2019-09-26 PROCEDURE — 85025 COMPLETE CBC W/AUTO DIFF WBC: CPT

## 2019-09-26 PROCEDURE — 83540 ASSAY OF IRON: CPT

## 2019-09-26 PROCEDURE — 82565 ASSAY OF CREATININE: CPT

## 2019-09-26 NOTE — LETTER
September 26, 2019      Olivia Ruth, [address]  [place] [address]           [facility]  [place] [facility]  [address], [address]  [place] [address]  Phone: [phone]  Fax: [phone]          Patient: Karis Suarez   MR Number: [id number]   YOB: 1960   Date of Visit: 9/26/2019       Dear Dr. Olivia Ruth:    Thank you for referring Karis Suarez to me for evaluation. Attached you will find relevant portions of my assessment and plan of care.    If you have questions, please do not hesitate to call me. I look forward to following Karis Suarez along with you.    Sincerely,    Rudy Vigil MD    Enclosure  CC:  No Recipients    If you would like to receive this communication electronically, please contact [email or web address] or [phone] to request more information on [employer] Link access.    For providers and/or their staff who would like to refer a patient to [facility], please contact us through our one-stop-shop provider referral line, [facility], at [phone].    If you feel you have received this communication in error or would no longer like to receive these types of communications, please e-mail [email or web address]

## 2019-09-26 NOTE — PROGRESS NOTES
Subjective:       Patient ID: Karis Suarez is a 59 y.o. female.    Chief Complaint: Follow-up    She is still having diarrhea with urgency.  She states it is watery in nature.  She has noted more abdominal bloating.  She has had occasional nausea without vomiting. She denies fever chills. She mainly complains of the joint symptoms particularly hip pain.  She was scheduled for hip surgery.  A year ago she had a right lumpectomy for cancer.  She completed her radiation and chemotherapy.  She thought that the tumor was eradicated.  She is followed by the oncologist.  The diarrhea has been of 1 month duration.  She is not changed her diet or medications.  She has not been exposed to and ill person.  She has never had similar gastrointestinal problems.  Her family history is negative for a gastrointestinal problems.      Allergies:  Review of patient's allergies indicates:  No Known Allergies    Medications:    Current Outpatient Medications:     cholestyramine, with sugar, 4 gram Powd, Take 4 g by mouth once daily., Disp: 1 Can, Rfl: 1    diphenoxylate-atropine 2.5-0.025 mg (LOMOTIL) 2.5-0.025 mg per tablet, Take 1 tablet by mouth every 6 (six) hours as needed for Diarrhea., Disp: 100 tablet, Rfl: 0    HYDROcodone-acetaminophen  mg Tab, Take by mouth., Disp: , Rfl:     letrozole (FEMARA) 2.5 mg Tab, Take 2.5 mg by mouth once daily., Disp: , Rfl:     omeprazole (PRILOSEC) 40 MG capsule, Take 1 capsule by mouth., Disp: , Rfl:     pravastatin (PRAVACHOL) 20 MG tablet, Take 20 mg by mouth once daily., Disp: , Rfl: 2    sertraline (ZOLOFT) 50 MG tablet, TAKE 1 2 (ONE HALF) TABLET BY MOUTH ONCE DAILY FOR 7 DAYS THEN INCREASE TO ONE TABLET ONCE DAILY THEREAFTER, Disp: , Rfl: 0    Past Medical History:   Diagnosis Date    AVN (avascular necrosis of bone)     Blood clot in vein     Breast cancer     Hypercholesteremia     Pulmonary embolism        Past Surgical History:   Procedure Laterality Date     COLONOSCOPY N/A 2/25/2019    Procedure: COLONOSCOPY;  Surgeon: Jaxson Chow MD;  Location: [facility] ENDO;  Service: General;  Laterality: N/A;    FOOT SURGERY Left     REMOVAL OF VASCULAR ACCESS PORT      TUBAL LIGATION           Review of Systems   Constitutional: Negative for appetite change, fever and unexpected weight change.   HENT: Negative for trouble swallowing.         No jaundice.   Respiratory: Negative for cough, shortness of breath and wheezing.         She is a cigarette smoker.  She she has occasional coughing but she denies hemoptysis or persistent sputum production.  She denies dyspnea but she is not as physically active.   Cardiovascular: Negative for chest pain.   Gastrointestinal: Positive for abdominal distention, diarrhea and nausea. Negative for abdominal pain, anal bleeding, blood in stool, constipation and rectal pain.        She has been on omeprazole for reflux but she denies symptoms of pyrosis or dyspepsia.  She denies dysphagia hematemesis hematochezia jaundice or bleeding.   Musculoskeletal: Positive for arthralgias and back pain. Negative for neck pain.        She is on hydrocodone for the pain.  She is scheduled for surgery.  She is not taking anti-inflammatory agents.   Skin: Negative for pallor and rash.   Neurological: Negative for dizziness, seizures, syncope, speech difficulty, weakness and numbness.   Hematological: Negative for adenopathy.   Psychiatric/Behavioral: Negative for confusion.        The Zoloft has helped the insomnia and anxiety and stress.       Objective:      Physical Exam   Constitutional: She is oriented to person, place, and time. She appears well-developed and well-nourished.   Thin elderly nonicteric white female   HENT:   Head: Normocephalic.   Eyes: Pupils are equal, round, and reactive to light. EOM are normal.   Neck: Normal range of motion. Neck supple. No tracheal deviation present. No thyromegaly present.   Cardiovascular: Normal rate, regular  rhythm and normal heart sounds.   Pulmonary/Chest: Effort normal and breath sounds normal.   Abdominal: Soft. Bowel sounds are normal. She exhibits no distension and no mass. There is no tenderness. There is no rebound and no guarding. No hernia.   Abdomen is soft mildly above plane.  The increased bowel sounds. Definite masses organomegaly are absent.   Musculoskeletal: Normal range of motion.   She has an antalgic gait.  She is able to ambulate slowly.  She can go from the sitting to the standing position with assistance.  She can get on the exam table with minimal assistance.   Lymphadenopathy:     She has no cervical adenopathy.   Neurological: She is alert and oriented to person, place, and time. No cranial nerve deficit.   Skin: Skin is warm and dry.   Psychiatric: She has a normal mood and affect. Her behavior is normal.   Vitals reviewed.        Plan:       Diarrhea, unspecified type    Diverticulosis of large intestine without hemorrhage     She will continue her current medications and diet.  CT scan labs will be obtained.  She will continue to of be followed by her orthopedist an oncologist.

## 2019-10-01 ENCOUNTER — TELEPHONE (OUTPATIENT)
Dept: PAIN MEDICINE | Facility: CLINIC | Age: 59
End: 2019-10-01

## 2019-10-01 DIAGNOSIS — K58.0 IRRITABLE BOWEL SYNDROME WITH DIARRHEA: Primary | ICD-10-CM

## 2019-10-01 LAB — METHYLMALONATE SERPL-SCNC: 0.11 UMOL/L

## 2019-10-01 NOTE — TELEPHONE ENCOUNTER
Informed pt that an abdominal US needs to be completed before a CT scan can be done. Orders will be pended for approval and I will reach out to the patient on 10/2 to get the US scheduled.

## 2019-10-01 NOTE — TELEPHONE ENCOUNTER
----- Message from Yolanda Deutsch sent at 10/1/2019 10:07 AM CDT -----  Contact: Karis  Type: Needs Medical Advice    Who Called:  patient  Best Call Back Number: [phone] (home)   Additional Information: requesting a status update for CT scan tomorrow 10/2--patient states that CT was denied but appeal has been initiated--the last few days she has gotten sores in her mouth & she doesn't know if this is a direct result of what is going on but wanted Dr. Vigil to know what is going on--please advise--thank you

## 2019-10-02 ENCOUNTER — TELEPHONE (OUTPATIENT)
Dept: GASTROENTEROLOGY | Facility: CLINIC | Age: 59
End: 2019-10-02

## 2019-10-02 DIAGNOSIS — R19.7 DIARRHEA, UNSPECIFIED TYPE: Primary | ICD-10-CM

## 2019-10-02 DIAGNOSIS — R10.84 GENERALIZED ABDOMINAL PAIN: Primary | ICD-10-CM

## 2019-10-02 NOTE — TELEPHONE ENCOUNTER
Ins would not cover a ct scan being done without an US being completed first. Spoke with Dr. Vigil and he confirmed to schedule Us.

## 2019-10-10 ENCOUNTER — HOSPITAL ENCOUNTER (OUTPATIENT)
Dept: RADIOLOGY | Facility: HOSPITAL | Age: 59
Discharge: HOME | End: 2019-10-10
Attending: INTERNAL MEDICINE
Payer: MEDICAID

## 2019-10-10 DIAGNOSIS — R10.84 GENERALIZED ABDOMINAL PAIN: ICD-10-CM

## 2019-10-10 DIAGNOSIS — K58.0 IRRITABLE BOWEL SYNDROME WITH DIARRHEA: ICD-10-CM

## 2019-10-10 PROCEDURE — 76700 US EXAM ABDOM COMPLETE: CPT | Mod: TC

## 2019-10-10 PROCEDURE — 76700 US EXAM ABDOM COMPLETE: CPT | Mod: 26,,, | Performed by: RADIOLOGY

## 2019-10-10 PROCEDURE — 76700 US ABDOMEN COMPLETE: ICD-10-PCS | Mod: 26,,, | Performed by: RADIOLOGY

## 2019-10-10 RX ORDER — DIPHENOXYLATE HYDROCHLORIDE AND ATROPINE SULFATE 2.5; .025 MG/1; MG/1
1 TABLET ORAL 4 TIMES DAILY PRN
Qty: 50 TABLET | Refills: 0 | Status: SHIPPED | OUTPATIENT
Start: 2019-10-10 | End: 2019-10-20

## 2019-10-10 NOTE — TELEPHONE ENCOUNTER
Pt notified of new med, Lomotil and need for upcoming appt, pt states she has an appt for this coming mondaty 10/14/19, instructed pt to keep appt.  Pt verbalized understanding.

## 2019-10-10 NOTE — TELEPHONE ENCOUNTER
Notified pt US was normal, pt states she is still having diarrhea unrelieved by Cholestyramine, asked pt if she is taking it as directed and she stated she is and that she has been taking it for the last month with no relief please advise

## 2019-10-14 ENCOUNTER — OFFICE VISIT (OUTPATIENT)
Dept: GASTROENTEROLOGY | Facility: CLINIC | Age: 59
End: 2019-10-14
Payer: MEDICAID

## 2019-10-14 VITALS
HEIGHT: 65 IN | BODY MASS INDEX: 20.83 KG/M2 | SYSTOLIC BLOOD PRESSURE: 112 MMHG | RESPIRATION RATE: 16 BRPM | HEART RATE: 90 BPM | WEIGHT: 125 LBS | DIASTOLIC BLOOD PRESSURE: 67 MMHG

## 2019-10-14 DIAGNOSIS — R19.7 DIARRHEA, UNSPECIFIED TYPE: Primary | ICD-10-CM

## 2019-10-14 DIAGNOSIS — K21.9 GASTROESOPHAGEAL REFLUX DISEASE WITHOUT ESOPHAGITIS: ICD-10-CM

## 2019-10-14 DIAGNOSIS — K57.30 DIVERTICULOSIS OF LARGE INTESTINE WITHOUT HEMORRHAGE: ICD-10-CM

## 2019-10-14 PROCEDURE — 99213 PR OFFICE/OUTPT VISIT, EST, LEVL III, 20-29 MIN: ICD-10-PCS | Mod: S$PBB,,, | Performed by: INTERNAL MEDICINE

## 2019-10-14 PROCEDURE — 99213 OFFICE O/P EST LOW 20 MIN: CPT | Mod: PBBFAC,PN | Performed by: INTERNAL MEDICINE

## 2019-10-14 PROCEDURE — 99213 OFFICE O/P EST LOW 20 MIN: CPT | Mod: S$PBB,,, | Performed by: INTERNAL MEDICINE

## 2019-10-14 PROCEDURE — 99999 PR PBB SHADOW E&M-EST. PATIENT-LVL III: ICD-10-PCS | Mod: PBBFAC,,, | Performed by: INTERNAL MEDICINE

## 2019-10-14 PROCEDURE — 99999 PR PBB SHADOW E&M-EST. PATIENT-LVL III: CPT | Mod: PBBFAC,,, | Performed by: INTERNAL MEDICINE

## 2019-10-14 RX ORDER — HYDROCODONE BITARTRATE AND ACETAMINOPHEN 10; 325 MG/1; MG/1
TABLET ORAL
Refills: 0 | COMMUNITY
Start: 2019-10-10

## 2019-10-14 NOTE — LETTER
October 14, 2019      Olivia Ruth, [address]  [place] [address]           [facility]  [place] [facility]  [address], [address]  [place] [address]  Phone: [phone]  Fax: [phone]          Patient: Karis Suarez   MR Number: [id number]   YOB: 1960   Date of Visit: 10/14/2019       Dear Dr. Olivia Ruth:    Thank you for referring Karis Suarez to me for evaluation. Attached you will find relevant portions of my assessment and plan of care.    If you have questions, please do not hesitate to call me. I look forward to following Karis Suarez along with you.    Sincerely,    Rudy Vigil MD    Enclosure  CC:  No Recipients    If you would like to receive this communication electronically, please contact [email or web address] or [phone] to request more information on [employer] Link access.    For providers and/or their staff who would like to refer a patient to [facility], please contact us through our one-stop-shop provider referral line, [facility], at [phone].    If you feel you have received this communication in error or would no longer like to receive these types of communications, please e-mail [email or web address]

## 2019-10-14 NOTE — PROGRESS NOTES
Subjective:       Patient ID: Karis Suarez is a 59 y.o. female.    Chief Complaint: Follow-up and Diarrhea    The ultrasound was normal.  Her insurance would not pay for the CT scan.  She does have a history of breast cancer but is in remission.  She is followed by the oncologist.  Her diarrhea started 2 months ago.  She was at 130 lb in the last visit she was down to 120 lb she started protein drinks and she is up to 124 lb.  She has frequent diarrhea watery in nature.  She was started on the Lomotil without benefit.  Nothing has helped her.  She states prior than 2 months ago she never had diarrhea the of a chronic nature.  She really did not have an irritable bowel or alternating bowel function with the constipation. She denies hematemesis hematochezia jaundice or bleeding.  She has pyrosis.  She denies dyspepsia.  She has recently had a colonoscopy which was unrevealing.  Her stool studies were negative.  She denies fever chills. She has complained of increased gassiness.  She does not related to a specific food.  She states she only takes her prescription medicines and has been taking them for months.  She is not taking over-the-counter medications or herbal medications.      Allergies:  Review of patient's allergies indicates:  No Known Allergies    Medications:    Current Outpatient Medications:     cholestyramine, with sugar, 4 gram Powd, Take 4 g by mouth once daily., Disp: 1 Can, Rfl: 1    diphenoxylate-atropine 2.5-0.025 mg (LOMOTIL) 2.5-0.025 mg per tablet, Take 1 tablet by mouth 4 (four) times daily as needed for Diarrhea., Disp: 50 tablet, Rfl: 0    HYDROcodone-acetaminophen (NORCO)  mg per tablet, TK 1 T PO QD PRN P, Disp: , Rfl: 0    letrozole (FEMARA) 2.5 mg Tab, Take 2.5 mg by mouth once daily., Disp: , Rfl:     omeprazole (PRILOSEC) 40 MG capsule, Take 1 capsule by mouth., Disp: , Rfl:     pravastatin (PRAVACHOL) 20 MG tablet, Take 20 mg by mouth once daily., Disp: , Rfl: 2     sertraline (ZOLOFT) 50 MG tablet, TAKE 1 2 (ONE HALF) TABLET BY MOUTH ONCE DAILY FOR 7 DAYS THEN INCREASE TO ONE TABLET ONCE DAILY THEREAFTER, Disp: , Rfl: 0    rifAXIMin (XIFAXAN) 550 mg Tab, Take 1 tablet (550 mg total) by mouth 2 (two) times daily., Disp: 20 tablet, Rfl: 0    Past Medical History:   Diagnosis Date    AVN (avascular necrosis of bone)     Blood clot in vein     Breast cancer     Hypercholesteremia     Pulmonary embolism        Past Surgical History:   Procedure Laterality Date    COLONOSCOPY N/A 2/25/2019    Procedure: COLONOSCOPY;  Surgeon: Jaxson Chow MD;  Location: [facility];  Service: General;  Laterality: N/A;    FOOT SURGERY Left     REMOVAL OF VASCULAR ACCESS PORT      TUBAL LIGATION           Review of Systems   Constitutional: Negative for appetite change, fever and unexpected weight change.   HENT: Negative for trouble swallowing.         No jaundice.   Respiratory: Negative for cough, shortness of breath and wheezing.         No Rales, Rhonchi, or Dyspnea.   Cardiovascular: Negative for chest pain.   Gastrointestinal: Positive for diarrhea. Negative for abdominal distention, abdominal pain, anal bleeding, blood in stool, constipation, nausea and rectal pain.        She has a history gastroesophageal reflux and been on omeprazole.  She has been on hold medicines for a long period of time.  Nothing has changed in the last 2 months.  She denies jaundice or bleeding.  She denies dysphagia but has had some pyrosis.   Musculoskeletal: Positive for arthralgias. Negative for back pain and neck pain.   Skin: Negative for pallor and rash.   Neurological: Negative for dizziness, seizures, syncope, speech difficulty, weakness and numbness.   Hematological: Negative for adenopathy.   Psychiatric/Behavioral: Negative for confusion.       Objective:      Physical Exam   Constitutional: She is oriented to person, place, and time. She appears well-developed and well-nourished.    Well-nourished well-hydrated nonicteric white female.   HENT:   Head: Normocephalic.   Eyes: Pupils are equal, round, and reactive to light. EOM are normal.   Neck: Normal range of motion. Neck supple. No tracheal deviation present. No thyromegaly present.   Cardiovascular: Normal rate, regular rhythm and normal heart sounds.   Pulmonary/Chest: Effort normal and breath sounds normal.   Abdominal: Soft. Bowel sounds are normal. She exhibits no distension and no mass. There is no tenderness. There is no rebound and no guarding. No hernia.   The abdomen is soft without organomegaly masses felt.  Bowel sounds are normal.   Musculoskeletal: Normal range of motion.   She can ambulate normally.  She can go from the sitting to the standing position without difficulty.   Lymphadenopathy:     She has no cervical adenopathy.   Neurological: She is alert and oriented to person, place, and time. No cranial nerve deficit.   Skin: Skin is warm and dry.   Psychiatric: She has a normal mood and affect. Her behavior is normal.   Vitals reviewed.        Plan:       Diarrhea, unspecified type  -     GASTRIN; Future; Expected date: 10/14/2019  -     Gastrointestinal Pathogens Panel, PCR; Future; Expected date: 10/14/2019  -     Celiac Disease Panel; Future; Expected date: 10/14/2019  -     rifAXIMin (XIFAXAN) 550 mg Tab; Take 1 tablet (550 mg total) by mouth 2 (two) times daily.  Dispense: 20 tablet; Refill: 0    Diverticulosis of large intestine without hemorrhage    Gastroesophageal reflux disease without esophagitis     She will continue her the shins.  She is scheduled for upper endoscopy.  Labs will be obtained for celiac disease.  She continues her reflux regimen.  She can continue her protein shakes.  Repeat stool studies will be obtained.

## 2019-10-14 NOTE — PATIENT INSTRUCTIONS
The diarrhea has continued.  To this point her evaluation has been normal.  The ultrasound was unrevealing.  She describes the is watery in further evaluation of her stool obtained.  She started on the xifamin 550 twice a day.  Repeat stool will be obtained. Upper endoscopy for the indigestion also to evaluate for for celiac disease. Further labs are obtained.

## 2019-10-14 NOTE — PROGRESS NOTES
She has good health knowledge.  Specifically she realizes upper Endoscopy is visualization of the upper GI tract with biopsies.  Specifically she realizes there is extremely small incidence of bleeding perforation or aspiration.

## 2019-10-15 ENCOUNTER — LAB VISIT (OUTPATIENT)
Dept: LAB | Facility: HOSPITAL | Age: 59
End: 2019-10-15
Attending: INTERNAL MEDICINE
Payer: MEDICAID

## 2019-10-15 DIAGNOSIS — R19.7 DIARRHEA, UNSPECIFIED TYPE: ICD-10-CM

## 2019-10-15 PROCEDURE — 87507 IADNA-DNA/RNA PROBE TQ 12-25: CPT

## 2019-10-15 PROCEDURE — 36415 COLL VENOUS BLD VENIPUNCTURE: CPT

## 2019-10-15 PROCEDURE — 82941 ASSAY OF GASTRIN: CPT

## 2019-10-15 PROCEDURE — 83516 IMMUNOASSAY NONANTIBODY: CPT | Mod: 59

## 2019-10-17 ENCOUNTER — TELEPHONE (OUTPATIENT)
Dept: FAMILY MEDICINE | Facility: CLINIC | Age: 59
End: 2019-10-17

## 2019-10-17 DIAGNOSIS — R19.7 DIARRHEA, UNSPECIFIED TYPE: Primary | ICD-10-CM

## 2019-10-17 LAB
GASTRIN SERPL-MCNC: 209 PG/ML
GLIADIN PEPTIDE IGA SER-ACNC: 9 UNITS
GLIADIN PEPTIDE IGG SER-ACNC: 3 UNITS
IGA SERPL-MCNC: 315 MG/DL (ref 70–400)
TTG IGA SER-ACNC: 7 UNITS
TTG IGG SER-ACNC: 5 UNITS

## 2019-10-17 NOTE — TELEPHONE ENCOUNTER
Spoke with pt. Informed pt she is to fast for this lab test. Verbalized an understanding.     ----- Message from Winter Melton sent at 10/17/2019 11:14 AM CDT -----  Contact: pt [phone]  Pt called back to see If you need her to fast for the labs that you ordered

## 2019-10-17 NOTE — TELEPHONE ENCOUNTER
See lab result note.     Labs entered per MD    ----- Message from Rudy Vigil MD sent at 10/17/2019 10:43 AM CDT -----  Tell her the gastrin  is elevated.  This may indicate that she has too much acid.  Repeat the fasting gastrin level.  And tell her will check the stomach for acid at time of upper endoscopy.

## 2019-10-18 ENCOUNTER — LAB VISIT (OUTPATIENT)
Dept: LAB | Facility: HOSPITAL | Age: 59
End: 2019-10-18
Attending: INTERNAL MEDICINE
Payer: MEDICAID

## 2019-10-18 DIAGNOSIS — R19.7 DIARRHEA, UNSPECIFIED TYPE: ICD-10-CM

## 2019-10-18 PROCEDURE — 36415 COLL VENOUS BLD VENIPUNCTURE: CPT

## 2019-10-18 PROCEDURE — 82941 ASSAY OF GASTRIN: CPT

## 2019-10-21 LAB — GASTRIN SERPL-MCNC: 152 PG/ML

## 2019-10-22 ENCOUNTER — TELEPHONE (OUTPATIENT)
Dept: GASTROENTEROLOGY | Facility: CLINIC | Age: 59
End: 2019-10-22

## 2019-10-22 NOTE — TELEPHONE ENCOUNTER
Pt notified Gastrin level was high but that Dr Vigil said it is due to the Omeprazole use and not what is causing diarrhea.  Pt asking about medication she is unable to recall name but said she could not afford it and believes someone has submitted an PA or appeal to insurance.  I have attempted to call [facility] pharmacy without answer, let her know I will cont to call and will update her when I know more.

## 2019-10-23 ENCOUNTER — TELEPHONE (OUTPATIENT)
Dept: GASTROENTEROLOGY | Facility: CLINIC | Age: 59
End: 2019-10-23

## 2019-11-01 NOTE — TELEPHONE ENCOUNTER
"Spoke with pt. States "I have been having diarrhea since September and this new Rx that was $700 is not working. I have a scope set up but not until 11/12/19. Im not sure what to do. Everything I eat goes right through me". Requested to know if pt needs refill. States "Im not sure".       ----- Message from Carmen Kemp sent at 11/1/2019  9:41 AM CDT -----  Contact: self  Patient requesting to speak to speak to nurse regarding medication fAXIMin (XIFAXAN) 550 mg Tab     Is not working for her per patient       Please call to advice [phone] (home)                   "

## 2019-11-06 ENCOUNTER — TELEPHONE (OUTPATIENT)
Dept: GASTROENTEROLOGY | Facility: CLINIC | Age: 59
End: 2019-11-06

## 2019-11-06 RX ORDER — DIPHENOXYLATE HYDROCHLORIDE AND ATROPINE SULFATE 2.5; .025 MG/1; MG/1
1 TABLET ORAL 4 TIMES DAILY PRN
Qty: 50 TABLET | Refills: 0 | Status: SHIPPED | OUTPATIENT
Start: 2019-11-06 | End: 2019-11-16

## 2019-11-06 NOTE — TELEPHONE ENCOUNTER
"Spoke with pt. States "I have used everything he gave me". Informed of MD communication.     Rx Lomotil pended  "

## 2019-11-06 NOTE — TELEPHONE ENCOUNTER
See other encounter.     ----- Message from Yolanda Deutsch sent at 11/6/2019 10:04 AM CST -----  Contact: Karis  Type:  Patient Returning Call    Who Called:  patient  Who Left Message for Patient:  Winter  Does the patient know what this is regarding?:  Previous message  Best Call Back Number:  [phone] (home)   Additional Information: please advise--thank you

## 2019-11-12 ENCOUNTER — HOSPITAL ENCOUNTER (OUTPATIENT)
Facility: HOSPITAL | Age: 59
Discharge: HOME | End: 2019-11-12
Attending: INTERNAL MEDICINE | Admitting: INTERNAL MEDICINE
Payer: MEDICAID

## 2019-11-12 ENCOUNTER — ANESTHESIA EVENT (OUTPATIENT)
Dept: SURGERY | Facility: HOSPITAL | Age: 59
End: 2019-11-12
Payer: MEDICAID

## 2019-11-12 ENCOUNTER — ANESTHESIA (OUTPATIENT)
Dept: SURGERY | Facility: HOSPITAL | Age: 59
End: 2019-11-12
Payer: MEDICAID

## 2019-11-12 VITALS
BODY MASS INDEX: 19.29 KG/M2 | RESPIRATION RATE: 21 BRPM | SYSTOLIC BLOOD PRESSURE: 136 MMHG | TEMPERATURE: 98 F | DIASTOLIC BLOOD PRESSURE: 85 MMHG | OXYGEN SATURATION: 98 % | HEIGHT: 66 IN | HEART RATE: 87 BPM | WEIGHT: 120 LBS

## 2019-11-12 PROCEDURE — 00731 ANES UPR GI NDSC PX NOS: CPT | Performed by: INTERNAL MEDICINE

## 2019-11-12 PROCEDURE — 25000003 PHARM REV CODE 250: Performed by: NURSE ANESTHETIST, CERTIFIED REGISTERED

## 2019-11-12 PROCEDURE — 88305 TISSUE EXAM BY PATHOLOGIST: ICD-10-PCS | Mod: 26,,, | Performed by: PATHOLOGY

## 2019-11-12 PROCEDURE — 63600175 PHARM REV CODE 636 W HCPCS: Performed by: ANESTHESIOLOGY

## 2019-11-12 PROCEDURE — D9220A PRA ANESTHESIA: Mod: ANES,,, | Performed by: ANESTHESIOLOGY

## 2019-11-12 PROCEDURE — D9220A PRA ANESTHESIA: ICD-10-PCS | Mod: ANES,,, | Performed by: ANESTHESIOLOGY

## 2019-11-12 PROCEDURE — D9220A PRA ANESTHESIA: ICD-10-PCS | Mod: CRNA,,, | Performed by: NURSE ANESTHETIST, CERTIFIED REGISTERED

## 2019-11-12 PROCEDURE — 88342 CHG IMMUNOCYTOCHEMISTRY: ICD-10-PCS | Mod: 26,,, | Performed by: PATHOLOGY

## 2019-11-12 PROCEDURE — 37000008 HC ANESTHESIA 1ST 15 MINUTES: Performed by: INTERNAL MEDICINE

## 2019-11-12 PROCEDURE — 27201012 HC FORCEPS, HOT/COLD, DISP: Performed by: INTERNAL MEDICINE

## 2019-11-12 PROCEDURE — 88342 IMHCHEM/IMCYTCHM 1ST ANTB: CPT | Performed by: PATHOLOGY

## 2019-11-12 PROCEDURE — 88305 TISSUE EXAM BY PATHOLOGIST: CPT | Mod: 59 | Performed by: PATHOLOGY

## 2019-11-12 PROCEDURE — 88305 TISSUE EXAM BY PATHOLOGIST: CPT | Mod: 26,,, | Performed by: PATHOLOGY

## 2019-11-12 PROCEDURE — 37000009 HC ANESTHESIA EA ADD 15 MINS: Performed by: INTERNAL MEDICINE

## 2019-11-12 PROCEDURE — 43239 EGD BIOPSY SINGLE/MULTIPLE: CPT | Performed by: INTERNAL MEDICINE

## 2019-11-12 PROCEDURE — 43239 PR EGD, FLEX, W/BIOPSY, SGL/MULTI: ICD-10-PCS | Mod: ,,, | Performed by: INTERNAL MEDICINE

## 2019-11-12 PROCEDURE — 63600175 PHARM REV CODE 636 W HCPCS: Performed by: NURSE ANESTHETIST, CERTIFIED REGISTERED

## 2019-11-12 PROCEDURE — 43239 EGD BIOPSY SINGLE/MULTIPLE: CPT | Mod: ,,, | Performed by: INTERNAL MEDICINE

## 2019-11-12 PROCEDURE — D9220A PRA ANESTHESIA: Mod: CRNA,,, | Performed by: NURSE ANESTHETIST, CERTIFIED REGISTERED

## 2019-11-12 PROCEDURE — 88342 IMHCHEM/IMCYTCHM 1ST ANTB: CPT | Mod: 26,,, | Performed by: PATHOLOGY

## 2019-11-12 RX ORDER — LIDOCAINE HYDROCHLORIDE 10 MG/ML
1 INJECTION, SOLUTION EPIDURAL; INFILTRATION; INTRACAUDAL; PERINEURAL ONCE
Status: DISCONTINUED | OUTPATIENT
Start: 2019-11-12 | End: 2019-11-12 | Stop reason: HOSPADM

## 2019-11-12 RX ORDER — SODIUM CHLORIDE, SODIUM LACTATE, POTASSIUM CHLORIDE, CALCIUM CHLORIDE 600; 310; 30; 20 MG/100ML; MG/100ML; MG/100ML; MG/100ML
INJECTION, SOLUTION INTRAVENOUS CONTINUOUS
Status: DISCONTINUED | OUTPATIENT
Start: 2019-11-12 | End: 2019-11-12 | Stop reason: HOSPADM

## 2019-11-12 RX ORDER — PROPOFOL 10 MG/ML
VIAL (ML) INTRAVENOUS
Status: DISCONTINUED | OUTPATIENT
Start: 2019-11-12 | End: 2019-11-12

## 2019-11-12 RX ORDER — SODIUM CHLORIDE, SODIUM LACTATE, POTASSIUM CHLORIDE, CALCIUM CHLORIDE 600; 310; 30; 20 MG/100ML; MG/100ML; MG/100ML; MG/100ML
125 INJECTION, SOLUTION INTRAVENOUS CONTINUOUS
Status: DISCONTINUED | OUTPATIENT
Start: 2019-11-12 | End: 2019-11-12 | Stop reason: HOSPADM

## 2019-11-12 RX ORDER — GLYCOPYRROLATE 0.2 MG/ML
INJECTION INTRAMUSCULAR; INTRAVENOUS
Status: DISCONTINUED | OUTPATIENT
Start: 2019-11-12 | End: 2019-11-12

## 2019-11-12 RX ORDER — ONDANSETRON 2 MG/ML
4 INJECTION INTRAMUSCULAR; INTRAVENOUS DAILY PRN
Status: DISCONTINUED | OUTPATIENT
Start: 2019-11-12 | End: 2019-11-12 | Stop reason: HOSPADM

## 2019-11-12 RX ADMIN — SODIUM CHLORIDE, POTASSIUM CHLORIDE, SODIUM LACTATE AND CALCIUM CHLORIDE: 600; 310; 30; 20 INJECTION, SOLUTION INTRAVENOUS at 08:11

## 2019-11-12 RX ADMIN — PROPOFOL 50 MG: 10 INJECTION, EMULSION INTRAVENOUS at 09:11

## 2019-11-12 RX ADMIN — GLYCOPYRROLATE 0.4 MG: 0.2 INJECTION INTRAMUSCULAR; INTRAVENOUS at 08:11

## 2019-11-12 RX ADMIN — PROPOFOL 50 MG: 10 INJECTION, EMULSION INTRAVENOUS at 08:11

## 2019-11-12 NOTE — PLAN OF CARE
Has met unit/department guidelines for discharge from each phase of the post procedure continuum. Leaving floor per w/c. AAO x3. Resp even and unlabored room air. No distress noted. All personal belongings returned to pt.

## 2019-11-12 NOTE — PROVATION PATIENT INSTRUCTIONS
Discharge Summary/Instructions after an Endoscopic Procedure  Patient Name: Karis Suarez  Patient MRN: [id number]  Patient YOB: 1960 Tuesday, November 12, 2019  Rudy Vigil MD  RESTRICTIONS:  During your procedure today, you received medications for sedation.  These   medications may affect your judgment, balance and coordination.  Therefore,   for 24 hours, you have the following restrictions:   - DO NOT drive a car, operate machinery, make legal/financial decisions,   sign important papers or drink alcohol.    ACTIVITY:  Today: no heavy lifting, straining or running due to procedural   sedation/anesthesia.  The following day: return to full activity including work.  DIET:  Eat and drink normally unless instructed otherwise.     TREATMENT FOR COMMON SIDE EFFECTS:  - Mild abdominal pain, nausea, belching, bloating or excessive gas:  rest,   eat lightly and use a heating pad.  - Sore Throat: treat with throat lozenges and/or gargle with warm salt   water.  - Because air was used during the procedure, expelling large amounts of air   from your rectum or belching is normal.  - If a bowel prep was taken, you may not have a bowel movement for 1-3 days.    This is normal.  SYMPTOMS TO WATCH FOR AND REPORT TO YOUR PHYSICIAN:  1. Abdominal pain or bloating, other than gas cramps.  2. Chest pain.  3. Back pain.  4. Signs of infection such as: chills or fever occurring within 24 hours   after the procedure.  5. Rectal bleeding, which would show as bright red, maroon, or black stools.   (A tablespoon of blood from the rectum is not serious, especially if   hemorrhoids are present.)  6. Vomiting.  7. Weakness or dizziness.  GO DIRECTLY TO THE NEAREST EMERGENCY ROOM IF YOU HAVE ANY OF THE FOLLOWING:      Difficulty breathing              Chills and/or fever over 101 F   Persistent vomiting and/or vomiting blood   Severe abdominal pain   Severe chest pain   Black, tarry stools   Bleeding- more than one  tablespoon   Any other symptom or condition that you feel may need urgent attention  Your doctor recommends these additional instructions:  If any biopsies were taken, your doctors clinic will contact you in 1 to 2   weeks with any results.  - Discharge patient to home (ambulatory).   - Resume previous diet.  For questions, problems or results please call your physician - Rudy Vigil MD at Work:  [phone].  [facility] EMERGENCY ROOM PHONE NUMBER: [phone]  IF A COMPLICATION OR EMERGENCY SITUATION ARISES AND YOU ARE UNABLE TO REACH   YOUR PHYSICIAN - GO DIRECTLY TO THE EMERGENCY ROOM.  MD Rudy Dominguez MD  11/12/2019 9:12:11 AM  This report has been verified and signed electronically.  [employer]

## 2019-11-12 NOTE — ANESTHESIA POSTPROCEDURE EVALUATION
Anesthesia Post Evaluation    Patient: Karis Suarez    Procedure(s) Performed: Procedure(s) (LRB):  EGD (ESOPHAGOGASTRODUODENOSCOPY) (N/A)    Final Anesthesia Type: general  Patient location during evaluation: PACU  Patient participation: Yes- Able to Participate  Level of consciousness: awake and awake and alert  Post-procedure vital signs: reviewed and stable  Pain management: adequate  Airway patency: patent  PONV status at discharge: No PONV  Anesthetic complications: no      Cardiovascular status: blood pressure returned to baseline  Respiratory status: unassisted and spontaneous ventilation  Hydration status: euvolemic  Follow-up not needed.          Vitals Value Taken Time   /85 11/12/2019  9:52 AM   Temp 36.7 °C (98 °F) 11/12/2019  9:55 AM   Pulse 83 11/12/2019  9:56 AM   Resp 22 11/12/2019  9:56 AM   SpO2 98 % 11/12/2019  9:56 AM   Vitals shown include unvalidated device data.      Event Time     Out of Recovery 09:43:21          Pain/Hill Score: Hill Score: 10 (11/12/2019  9:45 AM)  Modified Hill Score: 20 (11/12/2019 10:20 AM)

## 2019-11-12 NOTE — ANESTHESIA PREPROCEDURE EVALUATION
11/12/2019  Karis Suarez is a 59 y.o., female.    Pre-op Assessment    I have reviewed the Patient Summary Reports.    I have reviewed the Nursing Notes.   I have reviewed the Medications.     Review of Systems  Anesthesia Hx:  No problems with previous Anesthesia  Neg history of prior surgery. Denies Family Hx of Anesthesia complications.   Denies Personal Hx of Anesthesia complications.   Social:  Smoker    Hematology/Oncology:  Hematology Normal   Oncology Normal     EENT/Dental:EENT/Dental Normal   Cardiovascular:  Cardiovascular Normal     Pulmonary:  Pulmonary Normal    Renal/:  Renal/ Normal     Hepatic/GI:   GERD, well controlled    Musculoskeletal:  Musculoskeletal Normal    Neurological:  Neurology Normal    Endocrine:  Endocrine Normal    Dermatological:  Skin Normal    Psych:   Psychiatric History depression          Physical Exam  General:  Well nourished    Airway/Jaw/Neck:  Airway Findings: Mouth Opening: Normal Tongue: Normal  Mallampati: I  TM Distance: 4 - 6 cm        Eyes/Ears/Nose:  EYES/EARS/NOSE FINDINGS: Normal   Dental:  Dental Findings: Upper Dentures, Lower Dentures   Chest/Lungs:  Chest/Lungs Clear    Heart/Vascular:  Heart Findings: Normal Heart murmur: negative Vascular Findings: Normal    Abdomen:  Abdomen Findings: Normal    Musculoskeletal:  Musculoskeletal Findings: Normal   Skin:  Skin Findings: Normal    Mental Status:  Mental Status Findings: Normal        Anesthesia Plan  Type of Anesthesia, risks & benefits discussed:  Anesthesia Type:  general  Patient's Preference:   Intra-op Monitoring Plan: standard ASA monitors  Intra-op Monitoring Plan Comments:   Post Op Pain Control Plan:   Post Op Pain Control Plan Comments:   Induction:   IV  Beta Blocker:  Patient is not currently on a Beta-Blocker (No further documentation required).       Informed Consent: Patient  understands risks and agrees with Anesthesia plan.  Questions answered. Anesthesia consent signed with patient.  ASA Score: 2     Day of Surgery Review of History & Physical:    H&P update referred to the provider.

## 2019-11-12 NOTE — H&P
"Office Visit     10/14/2019  [facility] - [facility]astro   Rudy Vigil MD   Gastroenterology   Diarrhea, unspecified type +2 more   Dx   Follow-up , Diarrhea ; Referred by Olivia Ruth NP   Reason for Visit    Additional Documentation     Vitals:    /67 (BP Location: Left arm, Patient Position: Sitting, BP Method: Medium (Automatic))    Pulse 90    Resp 16    Ht 5' 5" (1.651 m)    Wt 56.7 kg (125 lb)    BMI 20.80 kg/m²    BSA 1.61 m²       More Vitals    Flowsheets:    Anthropometrics       SmartForms:     [facility] AMB - FALL RISK       Encounter Info:    Billing Info,    History,    Allergies,    Detailed Report       Instructions         Follow up in about 1 month (around 11/14/2019).   The diarrhea has continued.  To this point her evaluation has been normal.  The ultrasound was unrevealing.  She describes the is watery in further evaluation of her stool obtained.  She started on the xifamin 550 twice a day.  Repeat stool will be obtained. Upper endoscopy for the indigestion also to evaluate for for celiac disease. Further labs are obtained.          After Visit Summary (Printed 10/14/2019)   Progress Notes        She has good health knowledge.  Specifically she realizes upper Endoscopy is visualization of the upper GI tract with biopsies.  Specifically she realizes there is extremely small incidence of bleeding perforation or aspiration.      Progress Notes        Subjective:       Patient ID: Karis Suarez is a 59 y.o. female.     Chief Complaint: Follow-up and Diarrhea     The ultrasound was normal.  Her insurance would not pay for the CT scan.  She does have a history of breast cancer but is in remission.  She is followed by the oncologist.  Her diarrhea started 2 months ago.  She was at 130 lb in the last visit she was down to 120 lb she started protein drinks and she is up to 124 lb.  She has frequent diarrhea watery in nature.  She was started on the Lomotil without benefit.  " Nothing has helped her.  She states prior than 2 months ago she never had diarrhea the of a chronic nature.  She really did not have an irritable bowel or alternating bowel function with the constipation. She denies hematemesis hematochezia jaundice or bleeding.  She has pyrosis.  She denies dyspepsia.  She has recently had a colonoscopy which was unrevealing.  Her stool studies were negative.  She denies fever chills. She has complained of increased gassiness.  She does not related to a specific food.  She states she only takes her prescription medicines and has been taking them for months.  She is not taking over-the-counter medications or herbal medications.        Allergies:  Review of patient's allergies indicates:  No Known Allergies     Medications:     Current Outpatient Medications:     cholestyramine, with sugar, 4 gram Powd, Take 4 g by mouth once daily., Disp: 1 Can, Rfl: 1    diphenoxylate-atropine 2.5-0.025 mg (LOMOTIL) 2.5-0.025 mg per tablet, Take 1 tablet by mouth 4 (four) times daily as needed for Diarrhea., Disp: 50 tablet, Rfl: 0    HYDROcodone-acetaminophen (NORCO)  mg per tablet, TK 1 T PO QD PRN P, Disp: , Rfl: 0    letrozole (FEMARA) 2.5 mg Tab, Take 2.5 mg by mouth once daily., Disp: , Rfl:     omeprazole (PRILOSEC) 40 MG capsule, Take 1 capsule by mouth., Disp: , Rfl:     pravastatin (PRAVACHOL) 20 MG tablet, Take 20 mg by mouth once daily., Disp: , Rfl: 2    sertraline (ZOLOFT) 50 MG tablet, TAKE 1 2 (ONE HALF) TABLET BY MOUTH ONCE DAILY FOR 7 DAYS THEN INCREASE TO ONE TABLET ONCE DAILY THEREAFTER, Disp: , Rfl: 0    rifAXIMin (XIFAXAN) 550 mg Tab, Take 1 tablet (550 mg total) by mouth 2 (two) times daily., Disp: 20 tablet, Rfl: 0          Past Medical History:   Diagnosis Date    AVN (avascular necrosis of bone)      Blood clot in vein      Breast cancer      Hypercholesteremia      Pulmonary embolism                 Past Surgical History:   Procedure Laterality Date     COLONOSCOPY N/A 2/25/2019     Procedure: COLONOSCOPY;  Surgeon: Jaxson Chow MD;  Location: [facility] ENDO;  Service: General;  Laterality: N/A;    FOOT SURGERY Left      REMOVAL OF VASCULAR ACCESS PORT        TUBAL LIGATION                Review of Systems   Constitutional: Negative for appetite change, fever and unexpected weight change.   HENT: Negative for trouble swallowing.         No jaundice.   Respiratory: Negative for cough, shortness of breath and wheezing.         No Rales, Rhonchi, or Dyspnea.   Cardiovascular: Negative for chest pain.   Gastrointestinal: Positive for diarrhea. Negative for abdominal distention, abdominal pain, anal bleeding, blood in stool, constipation, nausea and rectal pain.        She has a history gastroesophageal reflux and been on omeprazole.  She has been on hold medicines for a long period of time.  Nothing has changed in the last 2 months.  She denies jaundice or bleeding.  She denies dysphagia but has had some pyrosis.   Musculoskeletal: Positive for arthralgias. Negative for back pain and neck pain.   Skin: Negative for pallor and rash.   Neurological: Negative for dizziness, seizures, syncope, speech difficulty, weakness and numbness.   Hematological: Negative for adenopathy.   Psychiatric/Behavioral: Negative for confusion.       Objective:   Physical Exam   Constitutional: She is oriented to person, place, and time. She appears well-developed and well-nourished.   Well-nourished well-hydrated nonicteric white female.   HENT:   Head: Normocephalic.   Eyes: Pupils are equal, round, and reactive to light. EOM are normal.   Neck: Normal range of motion. Neck supple. No tracheal deviation present. No thyromegaly present.   Cardiovascular: Normal rate, regular rhythm and normal heart sounds.   Pulmonary/Chest: Effort normal and breath sounds normal.   Abdominal: Soft. Bowel sounds are normal. She exhibits no distension and no mass. There is no tenderness. There is no rebound  and no guarding. No hernia.   The abdomen is soft without organomegaly masses felt.  Bowel sounds are normal.   Musculoskeletal: Normal range of motion.   She can ambulate normally.  She can go from the sitting to the standing position without difficulty.   Lymphadenopathy:     She has no cervical adenopathy.   Neurological: She is alert and oriented to person, place, and time. No cranial nerve deficit.   Skin: Skin is warm and dry.   Psychiatric: She has a normal mood and affect. Her behavior is normal.   Vitals reviewed.         Plan:       Diarrhea, unspecified type  -     GASTRIN; Future; Expected date: 10/14/2019  -     Gastrointestinal Pathogens Panel, PCR; Future; Expected date: 10/14/2019  -     Celiac Disease Panel; Future; Expected date: 10/14/2019  -     rifAXIMin (XIFAXAN) 550 mg Tab; Take 1 tablet (550 mg total) by mouth 2 (two) times daily.  Dispense: 20 tablet; Refill: 0     Diverticulosis of large intestine without hemorrhage     Gastroesophageal reflux disease without esophagitis      She will continue her the shins.  She is scheduled for upper endoscopy.  Labs will be obtained for celiac disease.  She continues her reflux regimen.  She can continue her protein shakes.  Repeat stool studies will be obtained.          Other Notes   All notes       Addendum Note from Angelic Esaprza LPN   Communications         Letter sent to Olivia Ruth NP    AMB Visit Summary: Provider Version   Not recorded   All Charges for This Encounter     Code Description Service Date Service Provider Modifiers Qty   44101 RI OFFICE/OUTPT VISIT,EST,LEVL III 10/14/2019 Rudy Vigil MD S$PBB 1   [id number] HC E&M-EST. PATIENT - LVL III 10/14/2019 Rudy Vigil MD PBBFAC, PN 1   [id number] RI PBB SHADOW E&M-EST. PATIENT-LVL III 10/14/2019 Rudy Vigil MD PBBFAC 1   Level of Service     Level of Service   RI OFFICE/OUTPT VISIT,EST,LEVL III [55666]   BestPractice Advisories     Click to view BestPractice Advisory history    AVS Reports     Date/Time Report Action User   10/14/2019 10:09 AM After Visit Summary Printed Angelic Esparza LPN   Encounter-Level Documents - 10/14/2019:     After Visit Summary - Document on 10/14/2019 10:09 AM by Angelic Esparza LPN: After Visit Summary   Orders Placed         GASTRIN       Celiac Disease Panel       Gastrointestinal Pathogens Panel, PCR      Case request GI: EGD (ESOPHAGOGASTRODUODENOSCOPY)      All Encounter Results    Medication Changes         rifaximin 550 mg Oral 2 times daily       hydrocodone/acetaminophen                  mg Tab, TK 1 T PO QD PRN P      Medication List    Fall Risk     Patient Mobility Status: Ambulatory w/ assistance   Number of falls in the past 12 months?: 0   Fall Risk?: No   Visit Diagnoses         Diarrhea, unspecified type      Diverticulosis of large intestine without hemorrhage      Gastroesophageal reflux disease without esophagitis      Problem List    She is here for upper endoscopy.  She has had pyrosis and dyspepsia.  She has history of chronic diarrhea.  She denies fever chills cardiopulmonary symptoms hematemesis hematochezia or aspiration.  She has good health knowledge.  She understands the procedures of upper endoscopy.  Specifically she realizes there is an extremely small incidence of bleeding perforation or aspiration.  Physical exam and history unchanged.  Physical examination.  Well-nourished well-hydrated nonicteric white female.  She is normocephalic.  Pupils are normal. Lungs reveal symmetrical respirations.  Heart reveals regular rhythm. The abdomen is soft without organomegaly masses felt.  Bowel sounds are normal.  There is mild tenderness in the epigastrium.  Neurologic review she is oriented x3 impression 1.  Chronic diarrhea.  Exclude celiac disease 2.  Gastroesophageal reflux with epigastric pain.  She is scheduled for upper endoscopy.

## 2019-11-12 NOTE — TRANSFER OF CARE
"Anesthesia Transfer of Care Note    Patient: Karis Suarez    Procedure(s) Performed: Procedure(s) (LRB):  EGD (ESOPHAGOGASTRODUODENOSCOPY) (N/A)    Patient location: PACU    Anesthesia Type: general    Transport from OR: Transported from OR on room air with adequate spontaneous ventilation    Post pain: adequate analgesia    Post assessment: no apparent anesthetic complications and tolerated procedure well    Post vital signs: stable    Level of consciousness: awake, alert and oriented    Nausea/Vomiting: no nausea/vomiting    Complications: none    Transfer of care protocol was followed      Last vitals:   Visit Vitals  /81   Pulse 84   Temp 36.8 °C (98.3 °F) (Oral)   Resp (!) 21   Ht 5' 6" (1.676 m)   Wt 54.4 kg (120 lb)   SpO2 97%   Breastfeeding? No   BMI 19.37 kg/m²     "

## 2019-11-18 ENCOUNTER — OFFICE VISIT (OUTPATIENT)
Dept: GASTROENTEROLOGY | Facility: CLINIC | Age: 59
End: 2019-11-18
Payer: MEDICAID

## 2019-11-18 VITALS
BODY MASS INDEX: 20.25 KG/M2 | DIASTOLIC BLOOD PRESSURE: 84 MMHG | HEART RATE: 103 BPM | HEIGHT: 66 IN | SYSTOLIC BLOOD PRESSURE: 124 MMHG | RESPIRATION RATE: 16 BRPM | OXYGEN SATURATION: 96 % | WEIGHT: 126 LBS

## 2019-11-18 DIAGNOSIS — K57.30 DIVERTICULA OF COLON: ICD-10-CM

## 2019-11-18 DIAGNOSIS — K52.9 CHRONIC DIARRHEA: ICD-10-CM

## 2019-11-18 DIAGNOSIS — K58.0 IRRITABLE BOWEL SYNDROME WITH DIARRHEA: ICD-10-CM

## 2019-11-18 DIAGNOSIS — K44.9 HIATAL HERNIA: ICD-10-CM

## 2019-11-18 DIAGNOSIS — K21.9 GASTROESOPHAGEAL REFLUX DISEASE WITHOUT ESOPHAGITIS: Primary | ICD-10-CM

## 2019-11-18 PROCEDURE — 99213 OFFICE O/P EST LOW 20 MIN: CPT | Mod: S$PBB,,, | Performed by: INTERNAL MEDICINE

## 2019-11-18 PROCEDURE — 99213 OFFICE O/P EST LOW 20 MIN: CPT | Mod: PBBFAC,PN | Performed by: INTERNAL MEDICINE

## 2019-11-18 PROCEDURE — 99213 PR OFFICE/OUTPT VISIT, EST, LEVL III, 20-29 MIN: ICD-10-PCS | Mod: S$PBB,,, | Performed by: INTERNAL MEDICINE

## 2019-11-18 PROCEDURE — 99999 PR PBB SHADOW E&M-EST. PATIENT-LVL III: CPT | Mod: PBBFAC,,, | Performed by: INTERNAL MEDICINE

## 2019-11-18 PROCEDURE — 99999 PR PBB SHADOW E&M-EST. PATIENT-LVL III: ICD-10-PCS | Mod: PBBFAC,,, | Performed by: INTERNAL MEDICINE

## 2019-11-18 RX ORDER — MELOXICAM 15 MG/1
TABLET ORAL
Refills: 2 | COMMUNITY
Start: 2019-10-24

## 2019-11-18 RX ORDER — FAMOTIDINE 40 MG/1
40 TABLET, FILM COATED ORAL DAILY
Qty: 90 TABLET | Refills: 3 | Status: SHIPPED | OUTPATIENT
Start: 2019-11-18 | End: 2020-11-17

## 2019-11-18 NOTE — LETTER
November 18, 2019      Olivia Ruth, [address]  [place] [address]           [facility]  [address], [address]  [place] [address]  Phone: [phone]  Fax: [phone]          Patient: Karis Suarez   MR Number: [id number]   YOB: 1960   Date of Visit: 11/18/2019       Dear Dr. Olivia Ruth:    Thank you for referring Karis Suarez to me for evaluation. Attached you will find relevant portions of my assessment and plan of care.    If you have questions, please do not hesitate to call me. I look forward to following Karis Suarez along with you.    Sincerely,    Rudy Vigil MD    Enclosure  CC:  No Recipients    If you would like to receive this communication electronically, please contact [email or web address] or [phone] to request more information on [employer] access.    For providers and/or their staff who would like to refer a patient to [facility], please contact us through our one-stop-shop provider referral line, [facility], at [phone].    If you feel you have received this communication in error or would no longer like to receive these types of communications, please e-mail [email or web address]

## 2019-11-18 NOTE — PROGRESS NOTES
Subjective:       Patient ID: Karis Suarez is a 59 y.o. female.    Chief Complaint: Follow-up    Upper endoscopy revealed hiatal hernia gastroesophageal reflux and mild gastritis.  Bleeding was not seen. She denies hematemesis hematochezia dysphagia or aspiration.  Ultrasound was normal. Her insurance company would not pay for the CT scan.  She has been followed by the oncologist.  She mainly complains of the back joint and hip pain.  She is scheduled to see the orthopedist and possibly surgery.  She states this week she has gold steroid injection in her right hip and she is going to stop the anti-inflammatory agents.  She jaundice of bleeding.  The omeprazole has not controlled her pyrosis and she is started on Pepcid 40 HS.  Addition she is having 20 bowel movements per day.  She has not responded to any medications.  Her evaluation at this point has been negative.  Her symptoms started 3 months ago.  She cannot pinpoint a precipitating food or activity or medication.  She denies hematemesis hematochezia jaundice or bleeding.  Her diarrhea is not associated with lactose intolerance.  She is able to consume minimal amounts of dairy products without difficulty.      Allergies:  Review of patient's allergies indicates:  No Known Allergies    Medications:    Current Outpatient Medications:     cholestyramine, with sugar, 4 gram Powd, Take 4 g by mouth once daily., Disp: 1 Can, Rfl: 1    diphenhydrAMINE-acetaminophen (TYLENOL PM)  mg Tab, Take 1 tablet by mouth nightly as needed., Disp: , Rfl:     HYDROcodone-acetaminophen (NORCO)  mg per tablet, TK 1 T PO QD PRN P, Disp: , Rfl: 0    letrozole (FEMARA) 2.5 mg Tab, Take 2.5 mg by mouth once daily., Disp: , Rfl:     meloxicam (MOBIC) 15 MG tablet, TK 1 T PO QD, Disp: , Rfl: 2    omeprazole (PRILOSEC) 40 MG capsule, Take 1 capsule by mouth., Disp: , Rfl:     pravastatin (PRAVACHOL) 20 MG tablet, Take 20 mg by mouth once daily., Disp: , Rfl: 2     rifAXIMin (XIFAXAN) 550 mg Tab, Take 1 tablet (550 mg total) by mouth 2 (two) times daily., Disp: 20 tablet, Rfl: 0    sertraline (ZOLOFT) 50 MG tablet, TAKE 1 2 (ONE HALF) TABLET BY MOUTH ONCE DAILY FOR 7 DAYS THEN INCREASE TO ONE TABLET ONCE DAILY THEREAFTER, Disp: , Rfl: 0    famotidine (PEPCID) 40 MG tablet, Take 1 tablet (40 mg total) by mouth once daily., Disp: 90 tablet, Rfl: 3    Past Medical History:   Diagnosis Date    AVN (avascular necrosis of bone)     Blood clot in vein     Breast cancer     Hypercholesteremia     Pulmonary embolism        Past Surgical History:   Procedure Laterality Date    COLONOSCOPY N/A 2/25/2019    Procedure: COLONOSCOPY;  Surgeon: Jaxson Chow MD;  Location: [facility];  Service: General;  Laterality: N/A;    ESOPHAGOGASTRODUODENOSCOPY N/A 11/12/2019    Procedure: EGD (ESOPHAGOGASTRODUODENOSCOPY);  Surgeon: Rudy Vigil MD;  Location: [facility];  Service: Endoscopy;  Laterality: N/A;    FOOT SURGERY Left     REMOVAL OF VASCULAR ACCESS PORT      TUBAL LIGATION           Review of Systems   Constitutional: Negative for appetite change, fever and unexpected weight change.   HENT: Negative for trouble swallowing.         No jaundice.   Respiratory: Negative for cough, shortness of breath and wheezing.         She is a daily cigarette smoker.  She has been offered the smoking cessation program in the past.  Her arthritis is limited her activities.  She denies exertional dyspnea.  She has occasional coughing but denies aspiration hemoptysis or chronic sputum production.   Cardiovascular: Negative for chest pain.        She denies exertional chest pain or rhythm disturbances.   Gastrointestinal: Positive for diarrhea. Negative for abdominal distention, abdominal pain, anal bleeding, blood in stool, constipation and nausea.   Musculoskeletal: Positive for arthralgias and back pain. Negative for neck pain.   Skin: Negative for pallor and rash.   Neurological: Negative  for dizziness, seizures, syncope, speech difficulty, weakness and numbness.   Hematological: Negative for adenopathy.   Psychiatric/Behavioral: Negative for confusion.       Objective:      Physical Exam   Constitutional: She is oriented to person, place, and time. She appears well-nourished.   Well-nourished well-hydrated thin nonicteric white female.   HENT:   Head: Normocephalic.   Eyes: Pupils are equal, round, and reactive to light. EOM are normal.   Neck: Normal range of motion. Neck supple. No tracheal deviation present. No thyromegaly present.   Cardiovascular: Normal rate, regular rhythm and normal heart sounds.   Pulmonary/Chest: Effort normal and breath sounds normal.   Abdominal: Soft. Bowel sounds are normal. She exhibits no distension and no mass. There is no tenderness. There is no rebound and no guarding. No hernia.   The abdomen is soft without tenderness masses organomegaly.  Bowel sounds are normal.   Musculoskeletal: Normal range of motion.   She ambulates slowly.  She can go from the sitting to the standing position.   Lymphadenopathy:     She has no cervical adenopathy.   Neurological: She is alert and oriented to person, place, and time. No cranial nerve deficit.   Skin: Skin is warm and dry.   Psychiatric: She has a normal mood and affect. Her behavior is normal.   Vitals reviewed.        Plan:       Gastroesophageal reflux disease without esophagitis  -     famotidine (PEPCID) 40 MG tablet; Take 1 tablet (40 mg total) by mouth once daily.  Dispense: 90 tablet; Refill: 3    Diverticula of colon    Hiatal hernia    Chronic diarrhea    Irritable bowel syndrome with diarrhea     She will continue her reflux regimen.  She will take the omeprazole in the morning and the Pepcid in the afternoon.  She continues her nutritious diet will add more fiber to the diet.  She follows up with her orthopedist.  She was stop the anti-inflammatory agents.  She continues her vitamins and minerals.    will try  the zelnorm

## 2019-11-18 NOTE — PATIENT INSTRUCTIONS
She will continue her reflux regimen.  She will take the omeprazole in the morning and she will be started on the Pepcid 40 mg HS.  She is given samples of the zeinorm .  She follows up with orthopedist.  She continues her other medications vitamins and minerals.  She continues her nutritious diet.  She will try to make a food diary and pinpoint off ending foods.

## 2019-11-22 ENCOUNTER — TELEPHONE (OUTPATIENT)
Dept: GASTROENTEROLOGY | Facility: CLINIC | Age: 59
End: 2019-11-22

## 2019-11-22 NOTE — TELEPHONE ENCOUNTER
Spoke with pt. Informed Biopsy is pending and clinic will call with results. Verbalized an understanding.     ----- Message from Mayda Gillespie sent at 11/22/2019  9:47 AM CST -----  Contact: self  Type:  Test Results    Who Called:  Patient   Name of Test (Lab/Mammo/Etc):  Biopsy   Date of Test:  11/12  Ordering Provider:  Dr TERESA Vigil  Where the test was performed:  [facility]  Best Call Back Number:  [phone]   Additional Information: na

## 2019-11-23 LAB
COMMENT: NORMAL
FINAL PATHOLOGIC DIAGNOSIS: NORMAL
GROSS: NORMAL

## 2019-12-11 ENCOUNTER — OFFICE VISIT (OUTPATIENT)
Dept: GASTROENTEROLOGY | Facility: CLINIC | Age: 59
End: 2019-12-11
Payer: MEDICAID

## 2019-12-11 VITALS
OXYGEN SATURATION: 97 % | DIASTOLIC BLOOD PRESSURE: 84 MMHG | WEIGHT: 126 LBS | HEIGHT: 66 IN | HEART RATE: 80 BPM | BODY MASS INDEX: 20.25 KG/M2 | SYSTOLIC BLOOD PRESSURE: 135 MMHG | RESPIRATION RATE: 16 BRPM

## 2019-12-11 DIAGNOSIS — K50.919 CROHN'S DISEASE WITH COMPLICATION, UNSPECIFIED GASTROINTESTINAL TRACT LOCATION: Primary | ICD-10-CM

## 2019-12-11 DIAGNOSIS — K59.2 NEUROGENIC BOWEL: ICD-10-CM

## 2019-12-11 DIAGNOSIS — F43.9 STRESS: ICD-10-CM

## 2019-12-11 DIAGNOSIS — R19.7 DIARRHEA, UNSPECIFIED TYPE: ICD-10-CM

## 2019-12-11 PROCEDURE — 99213 PR OFFICE/OUTPT VISIT, EST, LEVL III, 20-29 MIN: ICD-10-PCS | Mod: S$PBB,,, | Performed by: INTERNAL MEDICINE

## 2019-12-11 PROCEDURE — 99213 OFFICE O/P EST LOW 20 MIN: CPT | Mod: S$PBB,,, | Performed by: INTERNAL MEDICINE

## 2019-12-11 PROCEDURE — 99999 PR PBB SHADOW E&M-EST. PATIENT-LVL IV: CPT | Mod: PBBFAC,,, | Performed by: INTERNAL MEDICINE

## 2019-12-11 PROCEDURE — 99999 PR PBB SHADOW E&M-EST. PATIENT-LVL IV: ICD-10-PCS | Mod: PBBFAC,,, | Performed by: INTERNAL MEDICINE

## 2019-12-11 PROCEDURE — 99214 OFFICE O/P EST MOD 30 MIN: CPT | Mod: PBBFAC,PN | Performed by: INTERNAL MEDICINE

## 2019-12-11 RX ORDER — DULOXETIN HYDROCHLORIDE 30 MG/1
30 CAPSULE, DELAYED RELEASE ORAL DAILY
Qty: 30 CAPSULE | Refills: 11 | Status: SHIPPED | OUTPATIENT
Start: 2019-12-11 | End: 2021-02-18 | Stop reason: SDUPTHER

## 2019-12-11 RX ORDER — CHOLESTYRAMINE 4 G/9G
4 POWDER, FOR SUSPENSION ORAL DAILY
Qty: 1 CAN | Refills: 1 | Status: SHIPPED | OUTPATIENT
Start: 2019-12-11 | End: 2020-12-10

## 2019-12-11 NOTE — LETTER
December 11, 2019      Olivia Ruth, [address]  [place] [address]           [facility]  [address], [address]  [place] [address]  Phone: [phone]  Fax: [phone]          Patient: Karis Suarez   MR Number: [id number]   YOB: 1960   Date of Visit: 12/11/2019       Dear Dr. Olivia Ruth:    Thank you for referring Karis Suarez to me for evaluation. Attached you will find relevant portions of my assessment and plan of care.    If you have questions, please do not hesitate to call me. I look forward to following Karis Suarez along with you.    Sincerely,    Rudy Vigil MD    Enclosure  CC:  No Recipients    If you would like to receive this communication electronically, please contact [email or web address] or [phone] to request more information on [email or web address] Link access.    For providers and/or their staff who would like to refer a patient to [facility], please contact us through our one-stop-shop provider referral line, [facility], at [phone].    If you feel you have received this communication in error or would no longer like to receive these types of communications, please e-mail [email or web address]

## 2019-12-11 NOTE — PATIENT INSTRUCTIONS
She believes the Pepcid is helped her.  She was found on upper endoscopy to have a small hiatal hernia and gastritis.  The biopsies were negative for H pylori.  To this point nothing has helped the diarrhea.  She is having postprandial bloating.  She will be started on the cholestyramine if if it can be obtained from the insurance company.  In addition because of the stress denies anxiety she started on the Cymbalta.  She will continue her reflux regimen current medications.  Upper GI and small-bowel follow-through will be obtained. Possibly she has inflammatory bowel disease.

## 2019-12-11 NOTE — PROGRESS NOTES
Subjective:       Patient ID: Karis Suarez is a 59 y.o. female.    Chief Complaint: Follow-up and Diarrhea    Upper endoscopy revealed a hiatal hernia gastroesophageal reflux.  The biopsies were negative for H pylori.  She was started on the Pepcid and she states that is helped her pyrosis and possibly the diarrhea.  She has been tried on the Xifaxan  Colestid Lomotil.  She has cannot pinpoint a precipitating factor for her diarrhea.  She does not related to milk products.  Her son  of an accident but her diarrhea had started prior to this.  She had a colonoscopy as a screening test.  She did not believe she was having diarrhea at that time.  All of her labs and stool studies were normal.  The serum gastrin is mildly elevated but she was on the omeprazole.  She denies fever chills hematemesis hematochezia jaundice.  she has had surgery for breast cancer and has a follow-up mammogram scheduled this month.  She she has chronic hip pain and her surgeons been postponed.  She stopped the anti-inflammatory agents and they did not influenced her symptoms.      Allergies:  Review of patient's allergies indicates:  No Known Allergies    Medications:    Current Outpatient Medications:     diphenhydrAMINE-acetaminophen (TYLENOL PM)  mg Tab, Take 1 tablet by mouth nightly as needed., Disp: , Rfl:     famotidine (PEPCID) 40 MG tablet, Take 1 tablet (40 mg total) by mouth once daily., Disp: 90 tablet, Rfl: 3    HYDROcodone-acetaminophen (NORCO)  mg per tablet, TK 1 T PO QD PRN P, Disp: , Rfl: 0    letrozole (FEMARA) 2.5 mg Tab, Take 2.5 mg by mouth once daily., Disp: , Rfl:     sertraline (ZOLOFT) 50 MG tablet, TAKE 1 2 (ONE HALF) TABLET BY MOUTH ONCE DAILY FOR 7 DAYS THEN INCREASE TO ONE TABLET ONCE DAILY THEREAFTER, Disp: , Rfl: 0    cholestyramine, with sugar, 4 gram Powd, Take 4 g by mouth once daily., Disp: 1 Can, Rfl: 1    DULoxetine (CYMBALTA) 30 MG capsule, Take 1 capsule (30 mg total) by mouth  once daily., Disp: 30 capsule, Rfl: 11    meloxicam (MOBIC) 15 MG tablet, TK 1 T PO QD, Disp: , Rfl: 2    omeprazole (PRILOSEC) 40 MG capsule, Take 1 capsule by mouth., Disp: , Rfl:     pravastatin (PRAVACHOL) 20 MG tablet, Take 20 mg by mouth once daily., Disp: , Rfl: 2    rifAXIMin (XIFAXAN) 550 mg Tab, Take 1 tablet (550 mg total) by mouth 2 (two) times daily. (Patient not taking: Reported on 12/11/2019), Disp: 20 tablet, Rfl: 0    Past Medical History:   Diagnosis Date    AVN (avascular necrosis of bone)     Blood clot in vein     Breast cancer     Hypercholesteremia     Pulmonary embolism        Past Surgical History:   Procedure Laterality Date    COLONOSCOPY N/A 2/25/2019    Procedure: COLONOSCOPY;  Surgeon: Jaxson Chow MD;  Location: [facility];  Service: General;  Laterality: N/A;    ESOPHAGOGASTRODUODENOSCOPY N/A 11/12/2019    Procedure: EGD (ESOPHAGOGASTRODUODENOSCOPY);  Surgeon: Rudy Vigil MD;  Location: [facility];  Service: Endoscopy;  Laterality: N/A;    FOOT SURGERY Left     REMOVAL OF VASCULAR ACCESS PORT      TUBAL LIGATION           Review of Systems   Constitutional: Negative for appetite change, fever and unexpected weight change.   HENT: Negative for trouble swallowing.         No jaundice.   Respiratory: Negative for cough, shortness of breath and wheezing.         She is a daily cigarette smoker but she denies alcohol.  She has been offered the smoking cessation program in the past.  She denies significant pulmonary symptoms.  She denies aspiration hemoptysis or significant coughing or sputum production.  The serum gastrin was mildly elevated to but she was on the omeprazole.   Cardiovascular: Negative for chest pain.   Gastrointestinal: Positive for diarrhea and nausea. Negative for abdominal distention, abdominal pain, anal bleeding, blood in stool, constipation and rectal pain.        The diarrhea is not associated with incontinence.  She has minimal abdominal  discomfort but has occasional cramping.  The insurance company would not approve a CT scan of the abdomen.   Musculoskeletal: Positive for arthralgias and back pain. Negative for neck pain.   Skin: Negative for pallor and rash.   Neurological: Negative for dizziness, seizures, syncope, speech difficulty, weakness and numbness.   Hematological: Negative for adenopathy.   Psychiatric/Behavioral: Negative for confusion.       Objective:      Physical Exam   Constitutional: She is oriented to person, place, and time. She appears well-nourished.   Well-nourished well-hydrated nonicteric white female.   HENT:   Head: Normocephalic.   Eyes: Pupils are equal, round, and reactive to light. EOM are normal.   Neck: Normal range of motion. Neck supple. No tracheal deviation present. No thyromegaly present.   Cardiovascular: Normal rate, regular rhythm and normal heart sounds.   Pulmonary/Chest: Effort normal and breath sounds normal.   Abdominal: Soft. Bowel sounds are normal. She exhibits distension. She exhibits no mass. There is tenderness. There is no rebound and no guarding. No hernia.   The abdomen is soft mildly obese with mild tenderness in the epigastrium.  Organomegaly masses are not detected.  Bowel sounds are   Musculoskeletal: Normal range of motion.   She ambulates slowly.  She can go from the sitting to the standing position.   Lymphadenopathy:     She has no cervical adenopathy.   Neurological: She is alert and oriented to person, place, and time. No cranial nerve deficit.   Skin: Skin is warm and dry.   Psychiatric: She has a normal mood and affect. Her behavior is normal.   Vitals reviewed.        Plan:       Crohn's disease with complication, unspecified gastrointestinal tract location  -     FL Upper GI With Small Bowel; Future; Expected date: 12/11/2019    Diarrhea, unspecified type    Neurogenic bowel  -     cholestyramine, with sugar, 4 gram Powd; Take 4 g by mouth once daily.  Dispense: 1 Can; Refill:  1    Stress  -     DULoxetine (CYMBALTA) 30 MG capsule; Take 1 capsule (30 mg total) by mouth once daily.  Dispense: 30 capsule; Refill: 11     She will continue her reflux regimen.  She started on the cholestyramine.  She will make a food diary and attempt to identify the offending food.  She is scheduled for an upper GI and small-bowel follow-through.  It is possible she could have inflammatory bowel disease such as Crohn's disease. She will continue her current medications vitamins and minerals.  She started on the Cymbalta because of the stress and anxiety.  She follows up with her oncologist for the history breast cancer.

## 2019-12-16 ENCOUNTER — HOSPITAL ENCOUNTER (OUTPATIENT)
Dept: RADIOLOGY | Facility: HOSPITAL | Age: 59
Discharge: HOME | End: 2019-12-16
Attending: INTERNAL MEDICINE
Payer: MEDICAID

## 2019-12-16 DIAGNOSIS — K50.919 CROHN'S DISEASE WITH COMPLICATION, UNSPECIFIED GASTROINTESTINAL TRACT LOCATION: ICD-10-CM

## 2019-12-16 PROCEDURE — 74245 FL UPPER GI WITH SMALL BOWEL: ICD-10-PCS | Mod: 26,,, | Performed by: RADIOLOGY

## 2019-12-16 PROCEDURE — 74245 FL UPPER GI WITH SMALL BOWEL: CPT | Mod: TC,FY

## 2019-12-16 PROCEDURE — A9698 NON-RAD CONTRAST MATERIALNOC: HCPCS | Performed by: INTERNAL MEDICINE

## 2019-12-16 PROCEDURE — 25500020 PHARM REV CODE 255: Performed by: INTERNAL MEDICINE

## 2019-12-16 PROCEDURE — 74245 FL UPPER GI WITH SMALL BOWEL: CPT | Mod: 26,,, | Performed by: RADIOLOGY

## 2019-12-16 RX ADMIN — BARIUM SULFATE 140 ML: 980 POWDER, FOR SUSPENSION ORAL at 09:12

## 2019-12-19 ENCOUNTER — TELEPHONE (OUTPATIENT)
Dept: GASTROENTEROLOGY | Facility: CLINIC | Age: 59
End: 2019-12-19

## 2019-12-19 NOTE — TELEPHONE ENCOUNTER
Spoke with pt and informed her Barium Swallow study showed a small hiatal hernia. Pt stated she was already aware.

## 2019-12-19 NOTE — TELEPHONE ENCOUNTER
----- Message from Mayda Gillespie sent at 12/19/2019  3:33 PM CST -----  Contact: self  Type:  Test Results    Who Called:  patient  Name of Test (Lab/Mammo/Etc):  Upper GI  Date of Test:  12/12  Ordering Provider:  Dr Vigil  Where the test was performed:  [facility]  Best Call Back Number:  [phone] (home)   Additional Information:  na

## 2020-12-16 ENCOUNTER — CLINICAL SUPPORT (OUTPATIENT)
Dept: REHABILITATION | Facility: HOSPITAL | Age: 60
End: 2020-12-16
Payer: MEDICAID

## 2020-12-16 DIAGNOSIS — Z96.641 HISTORY OF RIGHT HIP REPLACEMENT: ICD-10-CM

## 2020-12-16 DIAGNOSIS — M25.559 HIP PAIN: Primary | ICD-10-CM

## 2020-12-16 DIAGNOSIS — Z96.641 HISTORY OF RIGHT HIP REPLACEMENT: Primary | ICD-10-CM

## 2020-12-16 PROCEDURE — 97162 PT EVAL MOD COMPLEX 30 MIN: CPT | Mod: PN

## 2020-12-16 NOTE — PROGRESS NOTES
Physical Therapy Evaluation/Plan of Care    Name: Karis Suarez  Clinic Number: [id number]    Therapy Diagnosis:   Encounter Diagnoses   Name Primary?    Hip pain Yes    History of right hip replacement      Physician: Bhanu Wilson Jr., *    Physician Orders: PT Eval and Treat R hip  Medical Diagnosis: R CHICO Posterolateral approach   Evaluation Date: 12/16/2020  Authorization period Expiration: 12/31/20  Plan of Care Certification Period: 3/16/20    Visit #: 1 Visits authorized: 12  Time In: 10:30  Time Out: 11:15  Total Billable Time: 45 minutes    Precautions: Standard      Subjective   Date of onset: A few years ago  Date of Surgery: 11/19/20       Past Medical History:   Diagnosis Date    AVN (avascular necrosis of bone)     Blood clot in vein     Breast cancer     Hiatal hernia     Hypercholesteremia     Pulmonary embolism     Stomach ulcer      Karis Suarez  has a past surgical history that includes Foot surgery (Left); Tubal ligation; Removal of vascular access port; Colonoscopy (N/A, 2/25/2019); Esophagogastroduodenoscopy (N/A, 11/12/2019); and Breast lumpectomy.    Karis has a current medication list which includes the following prescription(s): amitriptyline, biotin, cholestyramine (with sugar), diphenhydramine-acetaminophen, duloxetine, famotidine, hydrocodone-acetaminophen, letrozole, meloxicam, omeprazole, pravastatin, rifaximin, nurtec, rosuvastatin, and sertraline.    Review of patient's allergies indicates:  No Known Allergies     Prior Therapy: None for current condition  Social History: Patient lives alone in a one story home with 0 steps to enter   Occupation: None  Prior Level of Function: Unable to perform squatting, lifting, stairs and walking far distances without pain.   Current Level of Function: Unable to perform ADLs, walk > 20 min, sleep through the night, squat, perform stairs, get into and out of the bath due to recent surgery.     Pain:  Current 4/10, worst 10/10,  best 0/10   Location: buttocks, hip, back  right  Description: Aching, Throbbing, Sharp and Shooting  Aggravating Factors: Laying  Easing Factors: pain medication and ice      Onset/ANGELLA: gradual    Primary concern/ Chief complaints:  History of current condition - DELIA reports: a long history of symptoms, including hip and back pain. She had a R CHICO on 11/17/20. Prior to and after surgery when pt lies down at night she gets sharp pain down the posterior R LE to the foot and is unable to sleep trough the night due to the LE pain. She had a series of injections to her low back prior to CHICO that did not provide any relief. Pain medication temporarily relieves the pain. Pt presents to PT with rollator walker and antalgic gait. Occasionally pt uses cane at home to walk in between rooms but it is difficult for her. Sciatic pain continues to bother pt on R side. Pt has been compliant with precautions and is eager to get back to driving.    Pts goals: To not have pain anymore and be able to sleep through the night.     Objective     Gait: Antalgic gait, decreased knee extension during ambulation      Lower Extremity Strength  Right LE  Left LE    Knee extension: 4/5 Knee extension: 4+/5   Knee flexion: 4/5 Knee flexion: 5/5   Hip flexion: DNP Hip flexion: 4+/5   Hip extension:  DNP Hip extension: DNP   Hip abduction: 4/5 Tested in sitting Hip abduction: 4+/5 Tested in sitting   Hip adduction: 4/5 Tested in sitting Hip adduction 4+/5 Tested in sitting   Ankle dorsiflexion: 5/5 Ankle dorsiflexion: 5/5   Ankle plantarflexion: 4+/5 Ankle plantarflexion: 4+/5     Function:  - SLS R: 3 sec  - SLS L: 12 sec  - Semi-tandem stance R post 15 sec; L post 12 sec  - Sit <--> Stand: x5 no pain, good form sit to stand from high table     Palpation: moderate tenderness to palpation at Posterolateral R hip, R SI joint; R ITB    PT Evaluation Completed: Yes  Discussed Plan of Care with patient: Yes      TREATMENT   Home Exercises and  Patient Education Provided    Education provided re:   -performance of; QS, HS, GS, ankle pumps 2x daily  - progress towards goals   - role of therapy in multi - disciplinary team, goals for therapy  Pt educated on condition, POC, and expectations in therapy.  No spiritual or educational barriers to learning provided    Home exercises:  Pt will be provided HEP during course of treatment with progressions as appropriate. Pt was advised to perform these exercises free of pain, and to stop performing them if pain occurs.   DELIA demonstrated good  understanding of the education provided.       Functional Limitations Reports   Tool: LEFS  Score: 24/80; 70% limitation      Assessment   Delia is a 60 y.o. female referred to outpatient physical therapy with a medical diagnosis of R CHICO and presents to PT with decreased strength, mobility and balance and increased pain of R LE . In addition to CHICO, pt has ongoing R sided sciatic pain and sharp, shooting numbness and tingling that courses from the sciatic area to the R foot when pt lies down suggesting irritation of sciatic nerve. Patient demonstrates limitations as described in the problem list. Pt will benefit from physcial therapy services in order to maximize pain free and/or functional use of right LE. The following goals were discussed with the patient and patient is in agreement with them as to be addressed in the treatment plan.   Pt prognosis is Good.   Pt will benefit from skilled outpatient Physical Therapy to address the deficits stated above and in the chart below, provide pt/family education, and to maximize pt's level of independence.     Plan of care discussed with patient: Yes  Pt's spiritual, cultural and educational needs considered and pt agreeable to plan of care and goals as stated below:     Anticipated Barriers for therapy: R sided sciatic pain    Medical necessity is demonstrated by the following IMPAIRMENTS/PROBLEM LIST:    weakness, gait  instability, impaired balance, pain, decreased ROM and orthopedic precautions    Moderate complexity      Goals     Long Term Goals: 6 weeks  Pain: Decrease pain to 2/10 to allow for improved functional activity and ability to sleep through the night.  Strength: Improve strength in right LE to 5/5 for improved LE stability  ROM: Improve ROM to 30% of normal limits   Functional scale: Improve score on LEFS to 30% limitation  Lifting: Lift 10 lbs to waist level, and carry for 25 feet without pain or compensation  Walking: Increase walking distance/duration to 1 hour without pain               Pt will walk 25 feet without assistive device and normal gait pattern  Transfers: Perform supine to stand transfers without increased pain or limitation  Exercise: demonstrate independence with home exercise program to maintain gains made in therapy.          Plan   Certification Period: 12/16/2020 to 3/16/21.    Outpatient Physical Therapy 2 times weekly for 6 weeks to include the following interventions: patient education, Manual Therapy, Neuromuscular Re-ed, Patient Education and Therapeutic Exercise.   Pt may be seen by PTA as part of the rehabilitation team.     I certify the need for these services furnished under this plan of treatment and while under my care.    Tiny Crain, PT      Attestation:   I have seen the patient, reviewed the therapist's plan of care, and I agree with the plan of care.   I certify the need for these services furnished under this plan of treatment and while under my care.         _______________            ________                                               _____________________  Physician/Referring Practitioner                                                            Date of Signature

## 2021-01-07 DIAGNOSIS — M54.41 LUMBAGO WITH SCIATICA, RIGHT SIDE: Primary | ICD-10-CM

## 2021-01-08 ENCOUNTER — HOSPITAL ENCOUNTER (OUTPATIENT)
Dept: RADIOLOGY | Facility: HOSPITAL | Age: 61
Discharge: HOME | End: 2021-01-08
Attending: NURSE PRACTITIONER
Payer: MEDICAID

## 2021-01-08 DIAGNOSIS — M54.41 LUMBAGO WITH SCIATICA, RIGHT SIDE: ICD-10-CM

## 2021-01-08 PROCEDURE — 72110 X-RAY EXAM L-2 SPINE 4/>VWS: CPT | Mod: TC,FY

## 2021-01-08 PROCEDURE — 72110 X-RAY EXAM L-2 SPINE 4/>VWS: CPT | Mod: 26,,, | Performed by: RADIOLOGY

## 2021-01-08 PROCEDURE — 72110 XR LUMBAR SPINE COMPLETE 5 VIEW: ICD-10-PCS | Mod: 26,,, | Performed by: RADIOLOGY

## 2021-02-11 DIAGNOSIS — N95.1 MENOPAUSAL STATE: Primary | ICD-10-CM

## 2021-02-18 DIAGNOSIS — F43.9 STRESS: ICD-10-CM

## 2021-02-18 RX ORDER — DULOXETIN HYDROCHLORIDE 30 MG/1
30 CAPSULE, DELAYED RELEASE ORAL DAILY
Qty: 30 CAPSULE | Refills: 11 | Status: SHIPPED | OUTPATIENT
Start: 2021-02-18 | End: 2022-02-18

## 2021-02-23 ENCOUNTER — HOSPITAL ENCOUNTER (OUTPATIENT)
Dept: RADIOLOGY | Facility: HOSPITAL | Age: 61
Discharge: HOME | End: 2021-02-23
Attending: NURSE PRACTITIONER
Payer: MEDICAID

## 2021-02-23 DIAGNOSIS — N95.1 MENOPAUSAL STATE: ICD-10-CM

## 2021-02-23 PROCEDURE — 77080 DXA BONE DENSITY AXIAL: CPT | Mod: 26,,, | Performed by: RADIOLOGY

## 2021-02-23 PROCEDURE — 77080 DXA BONE DENSITY AXIAL: CPT | Mod: TC

## 2021-02-23 PROCEDURE — 77080 DEXA BONE DENSITY SPINE HIP: ICD-10-PCS | Mod: 26,,, | Performed by: RADIOLOGY

## 2021-03-25 ENCOUNTER — HOSPITAL ENCOUNTER (EMERGENCY)
Facility: HOSPITAL | Age: 61
Discharge: HOME | End: 2021-03-25
Attending: EMERGENCY MEDICINE
Payer: MEDICAID

## 2021-03-25 VITALS
BODY MASS INDEX: 22.5 KG/M2 | OXYGEN SATURATION: 99 % | SYSTOLIC BLOOD PRESSURE: 144 MMHG | HEART RATE: 105 BPM | RESPIRATION RATE: 18 BRPM | TEMPERATURE: 98 F | HEIGHT: 66 IN | DIASTOLIC BLOOD PRESSURE: 99 MMHG | WEIGHT: 140 LBS

## 2021-03-25 DIAGNOSIS — M54.50 CHRONIC BILATERAL LOW BACK PAIN WITHOUT SCIATICA: Primary | ICD-10-CM

## 2021-03-25 DIAGNOSIS — G89.29 CHRONIC BILATERAL LOW BACK PAIN WITHOUT SCIATICA: Primary | ICD-10-CM

## 2021-03-25 PROCEDURE — 99284 EMERGENCY DEPT VISIT MOD MDM: CPT | Mod: 25

## 2021-03-25 PROCEDURE — 63600175 PHARM REV CODE 636 W HCPCS: Performed by: EMERGENCY MEDICINE

## 2021-03-25 PROCEDURE — 96372 THER/PROPH/DIAG INJ SC/IM: CPT

## 2021-03-25 RX ORDER — ALENDRONATE SODIUM 70 MG/1
70 TABLET ORAL
COMMUNITY

## 2021-03-25 RX ORDER — ASPIRIN 81 MG/1
81 TABLET ORAL DAILY
COMMUNITY

## 2021-03-25 RX ORDER — CYCLOBENZAPRINE HCL 10 MG
10 TABLET ORAL NIGHTLY
COMMUNITY

## 2021-03-25 RX ORDER — MORPHINE SULFATE 4 MG/ML
6 INJECTION, SOLUTION INTRAMUSCULAR; INTRAVENOUS
Status: COMPLETED | OUTPATIENT
Start: 2021-03-25 | End: 2021-03-25

## 2021-03-25 RX ADMIN — MORPHINE SULFATE 6 MG: 4 INJECTION, SOLUTION INTRAMUSCULAR; INTRAVENOUS at 10:03

## 2021-11-18 DIAGNOSIS — M47.816 LUMBAR SPONDYLOSIS: ICD-10-CM

## 2021-11-18 DIAGNOSIS — M54.12 CERVICAL RADICULITIS: Primary | ICD-10-CM

## 2021-11-18 DIAGNOSIS — M54.50 LOW BACK PAIN: ICD-10-CM

## 2023-08-29 ENCOUNTER — HOSPITAL ENCOUNTER (EMERGENCY)
Facility: HOSPITAL | Age: 63
Discharge: HOME | End: 2023-08-29
Attending: EMERGENCY MEDICINE
Payer: MEDICAID

## 2023-08-29 VITALS
SYSTOLIC BLOOD PRESSURE: 99 MMHG | OXYGEN SATURATION: 96 % | WEIGHT: 140 LBS | RESPIRATION RATE: 19 BRPM | HEIGHT: 68 IN | DIASTOLIC BLOOD PRESSURE: 61 MMHG | TEMPERATURE: 98 F | HEART RATE: 77 BPM | BODY MASS INDEX: 21.22 KG/M2

## 2023-08-29 DIAGNOSIS — Z43.1 PEG (PERCUTANEOUS ENDOSCOPIC GASTROSTOMY) ADJUSTMENT/REPLACEMENT/REMOVAL: ICD-10-CM

## 2023-08-29 PROCEDURE — 99284 EMERGENCY DEPT VISIT MOD MDM: CPT | Mod: 25

## 2023-08-29 PROCEDURE — 43762 RPLC GTUBE NO REVJ TRC: CPT

## 2023-08-29 PROCEDURE — 74018 XR ABDOMEN AP 1 VIEW: ICD-10-PCS | Mod: 26,,, | Performed by: RADIOLOGY

## 2023-08-29 PROCEDURE — 74018 RADEX ABDOMEN 1 VIEW: CPT | Mod: TC

## 2023-08-29 PROCEDURE — 74018 RADEX ABDOMEN 1 VIEW: CPT | Mod: 26,,, | Performed by: RADIOLOGY

## 2023-08-29 NOTE — ED PROVIDER NOTES
Encounter Date: 8/29/2023       History     Chief Complaint   Patient presents with    peg tube pulled out     [age] year old female with history of head and neck cancer requiring PEG tube feedings here after accidentally pulling her PEG tube out at home about 2 hours ago. patient reports she snagged the tube on her clothing.  No pain.  No fever or chills.  No vomiting.    The history is provided by the patient and medical records.     Review of patient's allergies indicates:  No Known Allergies  Past Medical History:   Diagnosis Date    AVN (avascular necrosis of bone)     Blood clot in vein     Breast cancer     Chronic back pain     Hiatal hernia     Hypercholesteremia     Pulmonary embolism     Stomach ulcer      Past Surgical History:   Procedure Laterality Date    BREAST LUMPECTOMY      COLONOSCOPY N/A 2/25/2019    Procedure: COLONOSCOPY;  Surgeon: Jaxson Chow MD;  Location: [facility];  Service: General;  Laterality: N/A;    ESOPHAGOGASTRODUODENOSCOPY N/A 11/12/2019    Procedure: EGD (ESOPHAGOGASTRODUODENOSCOPY);  Surgeon: Rudy Vigil MD;  Location: [facility];  Service: Endoscopy;  Laterality: N/A;    FOOT SURGERY Left     REMOVAL OF VASCULAR ACCESS PORT      TUBAL LIGATION       Family History   Problem Relation Age of Onset    Breast cancer Mother     Skin cancer Mother     Skin cancer Brother      Social History     Tobacco Use    Smoking status: Every Day     Current packs/day: 1.00     Types: Cigarettes    Smokeless tobacco: Never   Substance Use Topics    Alcohol use: Yes     Comment: Occasional    Drug use: No     Review of Systems   Constitutional:  Negative for fever.   HENT:  Negative for sore throat.    Respiratory:  Negative for shortness of breath.    Cardiovascular:  Negative for chest pain.   Gastrointestinal:  Negative for nausea.        Peg tube pulled out   Genitourinary:  Negative for dysuria.   Musculoskeletal:  Negative for back pain.   Skin:  Negative for rash.    Neurological:  Negative for weakness.   Hematological:  Does not bruise/bleed easily.   All other systems reviewed and are negative.      Physical Exam     Initial Vitals [08/29/23 0604]   BP Pulse Resp Temp SpO2   117/77 102 18 97.7 °F (36.5 °C) 98 %      MAP       --         Physical Exam    Nursing note and vitals reviewed.  Constitutional: She appears well-developed and well-nourished.   HENT:   Head: Normocephalic and atraumatic.   Postsurgical changes of mandible and neck consistent with history of head and neck cancer   Eyes: EOM are normal.   Neck: Neck supple.   Cardiovascular:  Normal rate.           Pulmonary/Chest: No respiratory distress.   Abdominal: Abdomen is soft. There is no abdominal tenderness.   PEG tube stoma present   Musculoskeletal:         General: No edema.      Cervical back: Neck supple.     Neurological: She is alert and oriented to person, place, and time.   Skin: Skin is warm and dry.   Psychiatric: She has a normal mood and affect.         ED Course   Feeding Tube    Date/Time: 8/29/2023 6:32 AM  Location procedure was performed: [facility] EMERGENCY DEPARTMENT    Performed by: Armando tSein MD  Authorized by: Armando Stein MD  Consent: Verbal consent obtained.  Consent given by: patient  Indications: tube dislodged  Local anesthesia used: no    Anesthesia:  Local anesthesia used: no  Tube type: gastrostomy  Tube size: 24 Fr  Bulb inflation volume: 10 (ml)  Placement/position confirmation: x-ray and contrast  Tube placement difficulty: none  Complications: No  Estimated blood loss (mL): 0  Specimens: No  Implants: No  Patient tolerance: patient tolerated the procedure well with no immediate complications        Labs Reviewed - No data to display       Imaging Results              X-Ray Abdomen AP 1 View (Final result)  Result time 08/29/23 07:29:55      Final result by Armando Lucero MD (08/29/23 07:29:55)                   Impression:      1. No plain film evidence  for bowel obstruction.  2. Peg tube in place.      Electronically signed by: Armando Lucero  Date:    08/29/2023  Time:    07:29               Narrative:    EXAMINATION:  XR ABDOMEN AP 1 VIEW    CLINICAL HISTORY:  Encounter for attention to gastrostomy    TECHNIQUE:  Single spine view of the abdomen.    COMPARISON:  12/16/2019.    FINDINGS:  Scattered air and stool within the colon.  No plain film evidence for bowel obstruction.  No dilated loops of small bowel.    No significant abdominal calcifications. There is a PEG tube in place.    Bony pelvis is intact.                                       Medications - No data to display  Medical Decision Making  Differential diagnosis: PEG tube accidentally pulled out.  Patient is a 63-year-old female with PEG tube accidentally pulled out.  Plan is to replace at the bedside.    Amount and/or Complexity of Data Reviewed  Radiology: ordered.                               Clinical Impression:   Final diagnoses:  [Z43.1] PEG (percutaneous endoscopic gastrostomy) adjustment/replacement/removal        ED Disposition Condition    Discharge Stable          ED Prescriptions    None       Follow-up Information       Follow up With Specialties Details Why Contact Info    [facility] Emergency Dept Emergency Medicine  As needed [address] [facility] [address] [phone]             Armando Stein MD  08/29/23 07

## 2024-02-14 ENCOUNTER — LAB VISIT (OUTPATIENT)
Dept: LAB | Facility: HOSPITAL | Age: 64
End: 2024-02-14
Attending: DERMATOLOGY
Payer: MEDICARE

## 2024-02-14 DIAGNOSIS — Z01.812 PRE-OPERATIVE LABORATORY EXAMINATION: Primary | ICD-10-CM

## 2024-02-14 LAB
ALBUMIN SERPL BCP-MCNC: 3.9 G/DL (ref 3.5–5.2)
ALP SERPL-CCNC: 67 U/L (ref 55–135)
ALT SERPL W/O P-5'-P-CCNC: 20 U/L (ref 10–44)
ANION GAP SERPL CALC-SCNC: 11 MMOL/L (ref 8–16)
AST SERPL-CCNC: 22 U/L (ref 10–40)
BASOPHILS # BLD AUTO: 0.07 K/UL (ref 0–0.2)
BASOPHILS NFR BLD: 1 % (ref 0–1.9)
BILIRUB SERPL-MCNC: 0.3 MG/DL (ref 0.1–1)
BUN SERPL-MCNC: 16 MG/DL (ref 8–23)
CALCIUM SERPL-MCNC: 9.6 MG/DL (ref 8.7–10.5)
CHLORIDE SERPL-SCNC: 102 MMOL/L (ref 95–110)
CO2 SERPL-SCNC: 26 MMOL/L (ref 23–29)
CREAT SERPL-MCNC: 1 MG/DL (ref 0.5–1.4)
DIFFERENTIAL METHOD BLD: ABNORMAL
EOSINOPHIL # BLD AUTO: 0.4 K/UL (ref 0–0.5)
EOSINOPHIL NFR BLD: 6 % (ref 0–8)
ERYTHROCYTE [DISTWIDTH] IN BLOOD BY AUTOMATED COUNT: 14 % (ref 11.5–14.5)
EST. GFR  (NO RACE VARIABLE): >60 ML/MIN/1.73 M^2
GLUCOSE SERPL-MCNC: 96 MG/DL (ref 70–110)
HAV IGM SERPL QL IA: NORMAL
HBV CORE IGM SERPL QL IA: NORMAL
HBV SURFACE AG SERPL QL IA: NORMAL
HCT VFR BLD AUTO: 38.5 % (ref 37–48.5)
HCV AB SERPL QL IA: NORMAL
HGB BLD-MCNC: 12.3 G/DL (ref 12–16)
HIV 1+2 AB+HIV1 P24 AG SERPL QL IA: NORMAL
IMM GRANULOCYTES # BLD AUTO: 0.01 K/UL (ref 0–0.04)
IMM GRANULOCYTES NFR BLD AUTO: 0.1 % (ref 0–0.5)
LYMPHOCYTES # BLD AUTO: 2.6 K/UL (ref 1–4.8)
LYMPHOCYTES NFR BLD: 38.5 % (ref 18–48)
MCH RBC QN AUTO: 31.4 PG (ref 27–31)
MCHC RBC AUTO-ENTMCNC: 31.9 G/DL (ref 32–36)
MCV RBC AUTO: 98 FL (ref 82–98)
MONOCYTES # BLD AUTO: 0.6 K/UL (ref 0.3–1)
MONOCYTES NFR BLD: 8.5 % (ref 4–15)
NEUTROPHILS # BLD AUTO: 3.1 K/UL (ref 1.8–7.7)
NEUTROPHILS NFR BLD: 45.9 % (ref 38–73)
NRBC BLD-RTO: 0 /100 WBC
PLATELET # BLD AUTO: 272 K/UL (ref 150–450)
PMV BLD AUTO: 10.3 FL (ref 9.2–12.9)
POTASSIUM SERPL-SCNC: 3.9 MMOL/L (ref 3.5–5.1)
PROT SERPL-MCNC: 8.2 G/DL (ref 6–8.4)
RBC # BLD AUTO: 3.92 M/UL (ref 4–5.4)
SODIUM SERPL-SCNC: 139 MMOL/L (ref 136–145)
WBC # BLD AUTO: 6.67 K/UL (ref 3.9–12.7)

## 2024-02-14 PROCEDURE — 87389 HIV-1 AG W/HIV-1&-2 AB AG IA: CPT | Performed by: DERMATOLOGY

## 2024-02-14 PROCEDURE — 36415 COLL VENOUS BLD VENIPUNCTURE: CPT | Performed by: DERMATOLOGY

## 2024-02-14 PROCEDURE — 80074 ACUTE HEPATITIS PANEL: CPT | Performed by: DERMATOLOGY

## 2024-02-14 PROCEDURE — 80053 COMPREHEN METABOLIC PANEL: CPT | Performed by: DERMATOLOGY

## 2024-02-14 PROCEDURE — 85025 COMPLETE CBC W/AUTO DIFF WBC: CPT | Performed by: DERMATOLOGY

## 2024-09-01 ENCOUNTER — HOSPITAL ENCOUNTER (EMERGENCY)
Facility: HOSPITAL | Age: 64
Discharge: HOME | End: 2024-09-01
Attending: EMERGENCY MEDICINE
Payer: MEDICARE

## 2024-09-01 VITALS
DIASTOLIC BLOOD PRESSURE: 65 MMHG | HEIGHT: 66 IN | TEMPERATURE: 99 F | WEIGHT: 150 LBS | OXYGEN SATURATION: 98 % | SYSTOLIC BLOOD PRESSURE: 122 MMHG | RESPIRATION RATE: 16 BRPM | HEART RATE: 89 BPM | BODY MASS INDEX: 24.11 KG/M2

## 2024-09-01 VITALS
HEIGHT: 66 IN | DIASTOLIC BLOOD PRESSURE: 70 MMHG | TEMPERATURE: 98 F | OXYGEN SATURATION: 95 % | WEIGHT: 150 LBS | HEART RATE: 90 BPM | RESPIRATION RATE: 16 BRPM | SYSTOLIC BLOOD PRESSURE: 126 MMHG | BODY MASS INDEX: 24.11 KG/M2

## 2024-09-01 DIAGNOSIS — K94.23 LEAKING PEG TUBE: Primary | ICD-10-CM

## 2024-09-01 DIAGNOSIS — K94.22 INFECTION OF PERCUTANEOUS ENDOSCOPIC GASTROSTOMY (PEG) SITE: ICD-10-CM

## 2024-09-01 DIAGNOSIS — K94.23 MALFUNCTION OF PERCUTANEOUS ENDOSCOPIC GASTROSTOMY (PEG) TUBE: Primary | ICD-10-CM

## 2024-09-01 PROCEDURE — 43762 RPLC GTUBE NO REVJ TRC: CPT

## 2024-09-01 PROCEDURE — 25500020 PHARM REV CODE 255: Performed by: EMERGENCY MEDICINE

## 2024-09-01 PROCEDURE — 99281 EMR DPT VST MAYX REQ PHY/QHP: CPT | Mod: 25

## 2024-09-01 PROCEDURE — 74018 RADEX ABDOMEN 1 VIEW: CPT | Mod: 26,,, | Performed by: RADIOLOGY

## 2024-09-01 PROCEDURE — 74018 RADEX ABDOMEN 1 VIEW: CPT | Mod: TC

## 2024-09-01 PROCEDURE — 99284 EMERGENCY DEPT VISIT MOD MDM: CPT | Mod: 25,27

## 2024-09-01 RX ORDER — DIATRIZOATE MEGLUMINE AND DIATRIZOATE SODIUM 660; 100 MG/ML; MG/ML
120 SOLUTION ORAL; RECTAL
Status: COMPLETED | OUTPATIENT
Start: 2024-09-01 | End: 2024-09-01

## 2024-09-01 RX ORDER — SULFAMETHOXAZOLE AND TRIMETHOPRIM 200; 40 MG/5ML; MG/5ML
160 SUSPENSION ORAL EVERY 12 HOURS
Qty: 280 ML | Refills: 0 | Status: SHIPPED | OUTPATIENT
Start: 2024-09-01 | End: 2024-09-08

## 2024-09-01 RX ADMIN — DIATRIZOATE MEGLUMINE AND DIATRIZOATE SODIUM 120 ML: 660; 100 LIQUID ORAL; RECTAL at 05:09

## 2024-09-01 NOTE — ED PROVIDER NOTES
History     Chief Complaint   Patient presents with    Peg tube issue     Patient leaking tube feeding around the insertion site of PEG tube.  Seen here for PEG tube earlier today at which time it was replaced.     HPI:  Yesenia Suarez is a 64 y.o. female with PMH as below who presents to the [facility] emergency department for evaluation of PEG tube issue. She was here in ED earlier today for dislodged PEG tube, which I replaced with a 24 Fr (same size) with good aspirate and easy placement. She states PEG feeds are coming out of the side of the ostomy. She does not vent her tube. She has no other complaints.     PCP: Olivia Ruth NP    Review of patient's allergies indicates:  No Known Allergies   Past Medical History:   Diagnosis Date    AVN (avascular necrosis of bone)     Blood clot in vein     Breast cancer     Chronic back pain     Hiatal hernia     Hypercholesteremia     Pulmonary embolism     Stomach ulcer      Past Surgical History:   Procedure Laterality Date    BREAST LUMPECTOMY      COLONOSCOPY N/A 2/25/2019    Procedure: COLONOSCOPY;  Surgeon: Jaxson Chow MD;  Location: [facility];  Service: General;  Laterality: N/A;    ESOPHAGOGASTRODUODENOSCOPY N/A 11/12/2019    Procedure: EGD (ESOPHAGOGASTRODUODENOSCOPY);  Surgeon: Rudy Vigil MD;  Location: [facility];  Service: Endoscopy;  Laterality: N/A;    FOOT SURGERY Left     REMOVAL OF VASCULAR ACCESS PORT      TUBAL LIGATION         Family History   Problem Relation Name Age of Onset    Breast cancer Mother      Skin cancer Mother      Skin cancer Brother       Social History     Tobacco Use    Smoking status: Every Day     Current packs/day: 1.00     Types: Cigarettes    Smokeless tobacco: Never   Substance and Sexual Activity    Alcohol use: Yes     Comment: Occasional    Drug use: No    Sexual activity: Not Currently     Birth control/protection: Post-menopausal      Review of Systems     Review of Systems   Constitutional:  "Negative.  Negative for fever.   HENT: Negative.     Eyes: Negative.    Respiratory: Negative.     Cardiovascular: Negative.    Gastrointestinal: Negative.  Negative for abdominal pain.   Endocrine: Negative.    Genitourinary: Negative.    Musculoskeletal: Negative.    Skin: Negative.    Allergic/Immunologic: Negative.    Neurological: Negative.    Hematological: Negative.    Psychiatric/Behavioral: Negative.     All other systems reviewed and are negative.       Physical Exam     Initial Vitals [09/01/24 1534]   BP Pulse Resp Temp SpO2   122/65 89 16 98.5 °F (36.9 °C) 98 %      MAP       --          Nursing notes and vital signs reviewed.  Constitutional: Patient is in no acute distress.   Head: Normocephalic. Atraumatic.   Eyes:  Conjunctivae are not pale. No scleral icterus.   ENT: Mucous membranes moist.   Neck: Supple.   Cardiovascular: Regular rate. Regular rhythm.   Pulmonary: No respiratory distress.   Abdominal: Soft. Mildly distended. PEG in place, mild drainage around edge, tube feeds in correct port.    Musculoskeletal: Moves all extremities. No obvious deformities.   Skin: Warm and dry.   Neurological:  Alert, awake, and appropriate. Normal speech. No acute lateralizing neurologic deficits appreciated.   Psychiatric: Normal affect.       ED Course   Procedures  Vitals:    09/01/24 1534   BP: 122/65   Pulse: 89   Resp: 16   Temp: 98.5 °F (36.9 °C)   TempSrc: Oral   SpO2: 98%   Weight: 68 kg (150 lb)   Height: 5' 6" (1.676 m)     Lab Results Interpreted as Abnormal:  Labs Reviewed - No data to display   All Lab Results:  Results for orders placed or performed in visit on 02/14/24   CBC auto differential   Result Value Ref Range    WBC 6.67 3.90 - 12.70 K/uL    RBC 3.92 (L) 4.00 - 5.40 M/uL    Hemoglobin 12.3 12.0 - 16.0 g/dL    Hematocrit 38.5 37.0 - 48.5 %    MCV 98 82 - 98 fL    MCH 31.4 (H) 27.0 - 31.0 pg    MCHC 31.9 (L) 32.0 - 36.0 g/dL    RDW 14.0 11.5 - 14.5 %    Platelets 272 150 - 450 K/uL    MPV " 10.3 9.2 - 12.9 fL    Immature Granulocytes 0.1 0.0 - 0.5 %    Gran # (ANC) 3.1 1.8 - 7.7 K/uL    Immature Grans (Abs) 0.01 0.00 - 0.04 K/uL    Lymph # 2.6 1.0 - 4.8 K/uL    Mono # 0.6 0.3 - 1.0 K/uL    Eos # 0.4 0.0 - 0.5 K/uL    Baso # 0.07 0.00 - 0.20 K/uL    nRBC 0 0 /100 WBC    Gran % 45.9 38.0 - 73.0 %    Lymph % 38.5 18.0 - 48.0 %    Mono % 8.5 4.0 - 15.0 %    Eosinophil % 6.0 0.0 - 8.0 %    Basophil % 1.0 0.0 - 1.9 %    Differential Method Automated    Comprehensive metabolic panel   Result Value Ref Range    Sodium 139 136 - 145 mmol/L    Potassium 3.9 3.5 - 5.1 mmol/L    Chloride 102 95 - 110 mmol/L    CO2 26 23 - 29 mmol/L    Glucose 96 70 - 110 mg/dL    BUN 16 8 - 23 mg/dL    Creatinine 1.0 0.5 - 1.4 mg/dL    Calcium 9.6 8.7 - 10.5 mg/dL    Total Protein 8.2 6.0 - 8.4 g/dL    Albumin 3.9 3.5 - 5.2 g/dL    Total Bilirubin 0.3 0.1 - 1.0 mg/dL    Alkaline Phosphatase 67 55 - 135 U/L    AST 22 10 - 40 U/L    ALT 20 10 - 44 U/L    eGFR >60.0 >60 mL/min/1.73 m^2    Anion Gap 11 8 - 16 mmol/L   HIV 1/2 Ag/Ab (4th Gen)   Result Value Ref Range    HIV 1/2 Ag/Ab Non-reactive Non-reactive   Hepatitis panel, acute   Result Value Ref Range    Hepatitis B Surface Ag Non-reactive Non-reactive    Hep B C IgM Non-reactive Non-reactive    Hep A IgM Non-reactive Non-reactive    Hepatitis C Ab Non-reactive Non-reactive     Imaging Results              XR Gastric tube check, non-radiologist performed (Final result)  Result time 09/01/24 18:45:28      Final result by Jayjay Ruiz MD (09/01/24 18:45:28)                   Impression:      As above.      Electronically signed by: Jayjay Ruiz  Date:    09/01/2024  Time:    18:45               Narrative:    EXAMINATION:  XR GASTRIC TUBE CHECK, NON-RADIOLOGIST PERFORMED    CLINICAL HISTORY:  PEG placement verification;    TECHNIQUE:  120 cc Gastrografin injected into the patient's PEG tube 2 images obtained    COMPARISON:  None    FINDINGS:  Gastrostomy tube within  the stomach.  Contrast is present within the stomach and proximal small bowel.  No extravasation noted.                                     ED Physician's independent review of the above imaging: agree with radiologist, tube appears to be in proper position, no extravasation.     The emergency physician reviewed the vital signs / test results outlined above.     ED Discussion      Patient's evaluation in the ED does not suggest any emergent or life-threatening medical conditions requiring immediate intervention beyond what was provided in the ED, and I believe patient is safe for discharge. Regardless, an unremarkable evaluation in the ED does not preclude the development or presence of a serious or life-threatening condition. As such, patient was given return instructions for any change or worsening of symptoms.       ED Medication(s) Administered:  Medications   diatrizoate meglumineand-diatrizoate sodium (GASTROVIEW) solution 120 mL (120 mLs Oral Given 9/1/24 1714)       Prescription Management: I performed a review of the patient's current Rx medication list as input by nursing staff.    Discharge Medication List as of 9/1/2024  7:05 PM        CONTINUE these medications which have NOT CHANGED    Details   alendronate (FOSAMAX) 70 MG tablet Take 70 mg by mouth every 7 days., Historical Med      amitriptyline (ELAVIL) 25 MG tablet TK 1 T PO HS, Historical Med      aspirin (ECOTRIN) 81 MG EC tablet Take 81 mg by mouth once daily., Historical Med      biotin 10 mg Tab Take 1 tablet by mouth., Historical Med      cholestyramine, with sugar, 4 gram Powd Take 4 g by mouth once daily., Starting Wed 12/11/2019, Until Thu 12/10/2020, Normal      cyclobenzaprine (FLEXERIL) 10 MG tablet Take 10 mg by mouth every evening., Historical Med      diphenhydrAMINE-acetaminophen (TYLENOL PM)  mg Tab Take 1 tablet by mouth nightly as needed., Historical Med      DULoxetine (CYMBALTA) 30 MG capsule Take 1 capsule (30 mg total)  by mouth once daily., Starting Thu 2/18/2021, Until Fri 2/18/2022, Normal      famotidine (PEPCID) 40 MG tablet Take 1 tablet (40 mg total) by mouth once daily., Starting Mon 11/18/2019, Until Tue 11/17/2020, Normal      HYDROcodone-acetaminophen (NORCO)  mg per tablet TK 1 T PO QD PRN P, Historical Med      letrozole (FEMARA) 2.5 mg Tab Take 2.5 mg by mouth once daily., Historical Med      meloxicam (MOBIC) 15 MG tablet TK 1 T PO QD, Historical Med      omeprazole (PRILOSEC) 40 MG capsule Take 1 capsule by mouth., Starting Thu 4/12/2018, Historical Med      pravastatin (PRAVACHOL) 20 MG tablet Take 20 mg by mouth once daily., Starting Mon 6/3/2019, Historical Med      rifAXIMin (XIFAXAN) 550 mg Tab Take 1 tablet (550 mg total) by mouth 2 (two) times daily., Starting Mon 10/14/2019, Normal      rimegepant (NURTEC) ODT 75 mg DIS 1 T ON THE TONGUE Q 24 H PRF MIGRAINE HEADACHE. NTE 75 MG IN 24 H, Historical Med      rosuvastatin (CRESTOR) 10 MG tablet TK 1 T PO QD, Historical Med      sertraline (ZOLOFT) 50 MG tablet TAKE 1 2 (ONE HALF) TABLET BY MOUTH ONCE DAILY FOR 7 DAYS THEN INCREASE TO ONE TABLET ONCE DAILY THEREAFTER, Historical Med      sulfamethoxazole-trimethoprim 200-40 mg/5 ml (BACTRIM,SEPTRA) 200-40 mg/5 mL Susp 20 mLs by Per G Tube route every 12 (twelve) hours. for 7 days, Starting Sun 9/1/2024, Until Sun 9/8/2024, Normal               Follow-up Information       Your gastroenterologist In 2 days.               [facility] Emergency Dept.    Specialty: Emergency Medicine  Why: As needed, If symptoms worsen  Contact information:  [address] [address] [phone]                          Clinical Impression       ICD-10-CM ICD-9-CM   1. Leaking PEG tube  K94.23 536.42      ED Disposition Condition    Discharge Stable             Hubert Deutsch MD  09/01/24 1380

## 2024-09-01 NOTE — PROGRESS NOTES
120 cc of gastrografin mixture injected into PEG tube. Mixture of 1:1 of gastrografin: water. RANDOLPH

## 2024-09-01 NOTE — ED PROVIDER NOTES
History     Chief Complaint   Patient presents with    Abdominal Pain     Patient states last night her PEG tube came out.  Patient states she has been having problems with it recently.       HPI:  Yesenia Suarez is a 64 y.o. female with PMH as below who presents to the [facility] emergency department for evaluation of PEG issues. She states that 1-2 hrs ago she accidentally had the PEG tube get caught between her legs so she pulled it out unintentionally. She also notes erythema and tenderness around the PEG site not alleviated by a recent round of clindamycin. She has no other complaints.     PCP: Olivia Ruth NP    Review of patient's allergies indicates:  No Known Allergies   Past Medical History:   Diagnosis Date    AVN (avascular necrosis of bone)     Blood clot in vein     Breast cancer     Chronic back pain     Hiatal hernia     Hypercholesteremia     Pulmonary embolism     Stomach ulcer      Past Surgical History:   Procedure Laterality Date    BREAST LUMPECTOMY      COLONOSCOPY N/A 2/25/2019    Procedure: COLONOSCOPY;  Surgeon: Jaxson Chow MD;  Location: [facility];  Service: General;  Laterality: N/A;    ESOPHAGOGASTRODUODENOSCOPY N/A 11/12/2019    Procedure: EGD (ESOPHAGOGASTRODUODENOSCOPY);  Surgeon: Rudy Vigil MD;  Location: [facility];  Service: Endoscopy;  Laterality: N/A;    FOOT SURGERY Left     REMOVAL OF VASCULAR ACCESS PORT      TUBAL LIGATION         Family History   Problem Relation Name Age of Onset    Breast cancer Mother      Skin cancer Mother      Skin cancer Brother       Social History     Tobacco Use    Smoking status: Every Day     Current packs/day: 1.00     Types: Cigarettes    Smokeless tobacco: Never   Substance and Sexual Activity    Alcohol use: Yes     Comment: Occasional    Drug use: No    Sexual activity: Not Currently     Birth control/protection: Post-menopausal      Review of Systems     Review of Systems   Constitutional: Negative.  Negative for  fever.   HENT: Negative.     Eyes: Negative.    Respiratory: Negative.     Cardiovascular: Negative.    Gastrointestinal: Negative.  Negative for abdominal pain.   Endocrine: Negative.    Genitourinary: Negative.    Musculoskeletal: Negative.    Skin:  Positive for rash and wound.   Allergic/Immunologic: Negative.    Neurological: Negative.    Hematological: Negative.    Psychiatric/Behavioral: Negative.     All other systems reviewed and are negative.       Physical Exam     Initial Vitals [09/01/24 0751]   BP Pulse Resp Temp SpO2   126/70 90 16 97.9 °F (36.6 °C) 95 %      MAP       --          Nursing notes and vital signs reviewed.  Constitutional: Patient is in no acute distress.   Head: Normocephalic. Atraumatic.   Eyes:  Conjunctivae are not pale. No scleral icterus.   ENT: Mucous membranes moist.   Neck: Supple.   Cardiovascular: Regular rate. Regular rhythm.   Pulmonary: No respiratory distress.   Abdominal: Soft. Non-distended. Nontender. LUQ PEG site open with PEG separately at the bedside in a bag (24 Fr). Mild erythema, tenderness around PEG.   Musculoskeletal: Moves all extremities. No obvious deformities.   Skin: Warm and dry.   Neurological:  Alert, awake, and appropriate. Normal speech. No acute lateralizing neurologic deficits appreciated.   Psychiatric: Normal affect.       ED Course   Feeding Tube    Date/Time: 9/1/2024 9:10 AM  Location procedure was performed: [facility] EMERGENCY DEPARTMENT    Performed by: Guanaco Bonilla MD  Authorized by: Guanaco Bonilla MD  Consent: Verbal consent obtained.  Indications: tube removed by patient    Sedation:  Patient sedated: no    Local anesthesia used: no    Anesthesia:  Local anesthesia used: no  Tube type: gastrostomy  Patient position: supine  Procedure type: replacement  Tube size: 24 Fr  Endoscope used: no  Bulb inflation volume: 9 (ml)  Bulb inflation fluid: normal saline  Placement/position confirmation: gastric contents aspirated  Tube  "placement difficulty: none  Complications: No  Specimens: No  Implants: No  Patient tolerance: patient tolerated the procedure well with no immediate complications        Vitals:    09/01/24 0751   BP: 126/70   Pulse: 90   Resp: 16   Temp: 97.9 °F (36.6 °C)   TempSrc: Oral   SpO2: 95%   Weight: 68 kg (150 lb)   Height: 5' 6" (1.676 m)     Lab Results Interpreted as Abnormal:  Labs Reviewed - No data to display   All Lab Results:  Results for orders placed or performed in visit on 02/14/24   CBC auto differential   Result Value Ref Range    WBC 6.67 3.90 - 12.70 K/uL    RBC 3.92 (L) 4.00 - 5.40 M/uL    Hemoglobin 12.3 12.0 - 16.0 g/dL    Hematocrit 38.5 37.0 - 48.5 %    MCV 98 82 - 98 fL    MCH 31.4 (H) 27.0 - 31.0 pg    MCHC 31.9 (L) 32.0 - 36.0 g/dL    RDW 14.0 11.5 - 14.5 %    Platelets 272 150 - 450 K/uL    MPV 10.3 9.2 - 12.9 fL    Immature Granulocytes 0.1 0.0 - 0.5 %    Gran # (ANC) 3.1 1.8 - 7.7 K/uL    Immature Grans (Abs) 0.01 0.00 - 0.04 K/uL    Lymph # 2.6 1.0 - 4.8 K/uL    Mono # 0.6 0.3 - 1.0 K/uL    Eos # 0.4 0.0 - 0.5 K/uL    Baso # 0.07 0.00 - 0.20 K/uL    nRBC 0 0 /100 WBC    Gran % 45.9 38.0 - 73.0 %    Lymph % 38.5 18.0 - 48.0 %    Mono % 8.5 4.0 - 15.0 %    Eosinophil % 6.0 0.0 - 8.0 %    Basophil % 1.0 0.0 - 1.9 %    Differential Method Automated    Comprehensive metabolic panel   Result Value Ref Range    Sodium 139 136 - 145 mmol/L    Potassium 3.9 3.5 - 5.1 mmol/L    Chloride 102 95 - 110 mmol/L    CO2 26 23 - 29 mmol/L    Glucose 96 70 - 110 mg/dL    BUN 16 8 - 23 mg/dL    Creatinine 1.0 0.5 - 1.4 mg/dL    Calcium 9.6 8.7 - 10.5 mg/dL    Total Protein 8.2 6.0 - 8.4 g/dL    Albumin 3.9 3.5 - 5.2 g/dL    Total Bilirubin 0.3 0.1 - 1.0 mg/dL    Alkaline Phosphatase 67 55 - 135 U/L    AST 22 10 - 40 U/L    ALT 20 10 - 44 U/L    eGFR >60.0 >60 mL/min/1.73 m^2    Anion Gap 11 8 - 16 mmol/L   HIV 1/2 Ag/Ab (4th Gen)   Result Value Ref Range    HIV 1/2 Ag/Ab Non-reactive Non-reactive   Hepatitis " panel, acute   Result Value Ref Range    Hepatitis B Surface Ag Non-reactive Non-reactive    Hep B C IgM Non-reactive Non-reactive    Hep A IgM Non-reactive Non-reactive    Hepatitis C Ab Non-reactive Non-reactive     Imaging Results    None          The emergency physician reviewed the vital signs / test results outlined above.     ED Discussion      Patient's evaluation in the ED does not suggest any emergent or life-threatening medical conditions requiring immediate intervention beyond what was provided in the ED, and I believe patient is safe for discharge. Regardless, an unremarkable evaluation in the ED does not preclude the development or presence of a serious or life-threatening condition. As such, patient was given return instructions for any change or worsening of symptoms.       ED Medication(s) Administered:  Medications - No data to display    Prescription Management: I performed a review of the patient's current Rx medication list as input by nursing staff.    Patient's Medications   New Prescriptions    SULFAMETHOXAZOLE-TRIMETHOPRIM 200-40 MG/5 ML (BACTRIM,SEPTRA) 200-40 MG/5 ML SUSP    20 mLs by Per G Tube route every 12 (twelve) hours. for 7 days   Previous Medications    ALENDRONATE (FOSAMAX) 70 MG TABLET    Take 70 mg by mouth every 7 days.    AMITRIPTYLINE (ELAVIL) 25 MG TABLET    TK 1 T PO HS    ASPIRIN (ECOTRIN) 81 MG EC TABLET    Take 81 mg by mouth once daily.    BIOTIN 10 MG TAB    Take 1 tablet by mouth.    CHOLESTYRAMINE, WITH SUGAR, 4 GRAM POWD    Take 4 g by mouth once daily.    CYCLOBENZAPRINE (FLEXERIL) 10 MG TABLET    Take 10 mg by mouth every evening.    DIPHENHYDRAMINE-ACETAMINOPHEN (TYLENOL PM)  MG TAB    Take 1 tablet by mouth nightly as needed.    DULOXETINE (CYMBALTA) 30 MG CAPSULE    Take 1 capsule (30 mg total) by mouth once daily.    FAMOTIDINE (PEPCID) 40 MG TABLET    Take 1 tablet (40 mg total) by mouth once daily.    HYDROCODONE-ACETAMINOPHEN (NORCO)  MG PER  TABLET    TK 1 T PO QD PRN P    LETROZOLE (FEMARA) 2.5 MG TAB    Take 2.5 mg by mouth once daily.    MELOXICAM (MOBIC) 15 MG TABLET    TK 1 T PO QD    OMEPRAZOLE (PRILOSEC) 40 MG CAPSULE    Take 1 capsule by mouth.    PRAVASTATIN (PRAVACHOL) 20 MG TABLET    Take 20 mg by mouth once daily.    RIFAXIMIN (XIFAXAN) 550 MG TAB    Take 1 tablet (550 mg total) by mouth 2 (two) times daily.    RIMEGEPANT (NURTEC) ODT 75 MG    DIS 1 T ON THE TONGUE Q 24 H PRF MIGRAINE HEADACHE. NTE 75 MG IN 24 H    ROSUVASTATIN (CRESTOR) 10 MG TABLET    TK 1 T PO QD    SERTRALINE (ZOLOFT) 50 MG TABLET    TAKE 1 2 (ONE HALF) TABLET BY MOUTH ONCE DAILY FOR 7 DAYS THEN INCREASE TO ONE TABLET ONCE DAILY THEREAFTER   Modified Medications    No medications on file   Discontinued Medications    No medications on file         Follow-up Information       Olivia Ruth NP.    Specialty: Family Medicine  Contact information:  [address] [place] [id number] [phone]               with .               [place] - Emergency Dept.    Specialty: Emergency Medicine  Why: As needed, If symptoms worsen  Contact information:  [address] [place] [id number] [phone]                          Clinical Impression       ICD-10-CM ICD-9-CM   1. Malfunction of percutaneous endoscopic gastrostomy (PEG) tube  K94.23 536.42   2. Infection of percutaneous endoscopic gastrostomy (PEG) site  K94.22 536.41      ED Disposition Condition    Discharge Stable             Guanaco Bonilla MD  09/01/24 0911

## 2024-09-02 NOTE — DISCHARGE INSTRUCTIONS
As we discussed, continue with your daily maintenance of your tube, and open it occasionally to vent gas.  Follow-up with your gastroenterologist as we discussed, and also with your primary care provider.  Return here as needed or if worse in any way.

## 2024-09-02 NOTE — ED NOTES
Pericare performed around PEG tube/ostomy with chlorhexidene and NS. Dried and split gauze applied with no tape.

## 2024-09-05 ENCOUNTER — HOSPITAL ENCOUNTER (EMERGENCY)
Facility: HOSPITAL | Age: 64
Discharge: HOME | End: 2024-09-05
Attending: EMERGENCY MEDICINE
Payer: MEDICARE

## 2024-09-05 VITALS
OXYGEN SATURATION: 97 % | TEMPERATURE: 98 F | WEIGHT: 150 LBS | HEART RATE: 101 BPM | SYSTOLIC BLOOD PRESSURE: 168 MMHG | RESPIRATION RATE: 18 BRPM | BODY MASS INDEX: 24.11 KG/M2 | HEIGHT: 66 IN | DIASTOLIC BLOOD PRESSURE: 73 MMHG

## 2024-09-05 DIAGNOSIS — K94.23 LEAKING PEG TUBE: Primary | ICD-10-CM

## 2024-09-05 PROCEDURE — 99283 EMERGENCY DEPT VISIT LOW MDM: CPT

## 2024-09-12 NOTE — ED PROVIDER NOTES
History     Chief Complaint   Patient presents with    PEG TUBE LEAKING     Pt reports Peg Tube was changed Sunday Night here in ED and it is leaking.      HPI:  Yesenia Suarez is a 64 y.o. female with PMH as below who presents to the [facility] emergency department for evaluation of leaking PEG tube. Patient's PEG was changed recently in ED, has leaking around edge of tube, then leaking from feeding port with gaseous distension. Patient's GI has refused to move her appointment further up.     PCP: Olivia Ruth NP    Review of patient's allergies indicates:  No Known Allergies   Past Medical History:   Diagnosis Date    AVN (avascular necrosis of bone)     Blood clot in vein     Breast cancer     Chronic back pain     Hiatal hernia     Hypercholesteremia     Pulmonary embolism     Stomach ulcer      Past Surgical History:   Procedure Laterality Date    BREAST LUMPECTOMY      COLONOSCOPY N/A 2/25/2019    Procedure: COLONOSCOPY;  Surgeon: Jaxson Chow MD;  Location: [facility];  Service: General;  Laterality: N/A;    ESOPHAGOGASTRODUODENOSCOPY N/A 11/12/2019    Procedure: EGD (ESOPHAGOGASTRODUODENOSCOPY);  Surgeon: Rudy Vigil MD;  Location: [facility];  Service: Endoscopy;  Laterality: N/A;    FOOT SURGERY Left     REMOVAL OF VASCULAR ACCESS PORT      TUBAL LIGATION         Family History   Problem Relation Name Age of Onset    Breast cancer Mother      Skin cancer Mother      Skin cancer Brother       Social History     Tobacco Use    Smoking status: Every Day     Current packs/day: 1.00     Types: Cigarettes    Smokeless tobacco: Never   Substance and Sexual Activity    Alcohol use: Yes     Comment: Occasional    Drug use: No    Sexual activity: Not Currently     Birth control/protection: Post-menopausal      Review of Systems     Review of Systems   Constitutional: Negative.    HENT: Negative.     Eyes: Negative.    Respiratory: Negative.     Cardiovascular: Negative.    Gastrointestinal:   "Positive for abdominal distention. Negative for abdominal pain, nausea and vomiting.   Endocrine: Negative.    Genitourinary: Negative.    Musculoskeletal: Negative.    Skin: Negative.    Allergic/Immunologic: Negative.    Neurological: Negative.    Hematological: Negative.    Psychiatric/Behavioral: Negative.     All other systems reviewed and are negative.       Physical Exam     Initial Vitals [09/05/24 2122]   BP Pulse Resp Temp SpO2   (!) 168/73 101 18 98.4 °F (36.9 °C) 97 %      MAP       --          Nursing notes and vital signs reviewed.  Constitutional: Patient is in no acute distress.   Head: Normocephalic. Atraumatic.   Eyes:  Conjunctivae are not pale. No scleral icterus.   ENT: Mucous membranes moist.   Neck: Supple.   Cardiovascular: Regular rate. Regular rhythm.   Pulmonary: No respiratory distress.   Abdominal: Mildly distended, soft, nontender. Decreased bowel sounds, normal pitch, no tympany. PEG with leaking around tube edge.   Musculoskeletal: Moves all extremities. No obvious deformities.   Skin: Warm and dry.   Neurological:  Alert, awake, and appropriate. Normal speech. No acute lateralizing neurologic deficits appreciated.   Psychiatric: Normal affect.       ED Course   Procedures  Vitals:    09/05/24 2122   BP: (!) 168/73   Pulse: 101   Resp: 18   Temp: 98.4 °F (36.9 °C)   TempSrc: Oral   SpO2: 97%   Weight: 68 kg (150 lb)   Height: 5' 6" (1.676 m)     Lab Results Interpreted as Abnormal:  Labs Reviewed - No data to display   All Lab Results:  Results for orders placed or performed in visit on 02/14/24   CBC auto differential   Result Value Ref Range    WBC 6.67 3.90 - 12.70 K/uL    RBC 3.92 (L) 4.00 - 5.40 M/uL    Hemoglobin 12.3 12.0 - 16.0 g/dL    Hematocrit 38.5 37.0 - 48.5 %    MCV 98 82 - 98 fL    MCH 31.4 (H) 27.0 - 31.0 pg    MCHC 31.9 (L) 32.0 - 36.0 g/dL    RDW 14.0 11.5 - 14.5 %    Platelets 272 150 - 450 K/uL    MPV 10.3 9.2 - 12.9 fL    Immature Granulocytes 0.1 0.0 - 0.5 %    " Gran # (ANC) 3.1 1.8 - 7.7 K/uL    Immature Grans (Abs) 0.01 0.00 - 0.04 K/uL    Lymph # 2.6 1.0 - 4.8 K/uL    Mono # 0.6 0.3 - 1.0 K/uL    Eos # 0.4 0.0 - 0.5 K/uL    Baso # 0.07 0.00 - 0.20 K/uL    nRBC 0 0 /100 WBC    Gran % 45.9 38.0 - 73.0 %    Lymph % 38.5 18.0 - 48.0 %    Mono % 8.5 4.0 - 15.0 %    Eosinophil % 6.0 0.0 - 8.0 %    Basophil % 1.0 0.0 - 1.9 %    Differential Method Automated    Comprehensive metabolic panel   Result Value Ref Range    Sodium 139 136 - 145 mmol/L    Potassium 3.9 3.5 - 5.1 mmol/L    Chloride 102 95 - 110 mmol/L    CO2 26 23 - 29 mmol/L    Glucose 96 70 - 110 mg/dL    BUN 16 8 - 23 mg/dL    Creatinine 1.0 0.5 - 1.4 mg/dL    Calcium 9.6 8.7 - 10.5 mg/dL    Total Protein 8.2 6.0 - 8.4 g/dL    Albumin 3.9 3.5 - 5.2 g/dL    Total Bilirubin 0.3 0.1 - 1.0 mg/dL    Alkaline Phosphatase 67 55 - 135 U/L    AST 22 10 - 40 U/L    ALT 20 10 - 44 U/L    eGFR >60.0 >60 mL/min/1.73 m^2    Anion Gap 11 8 - 16 mmol/L   HIV 1/2 Ag/Ab (4th Gen)   Result Value Ref Range    HIV 1/2 Ag/Ab Non-reactive Non-reactive   Hepatitis panel, acute   Result Value Ref Range    Hepatitis B Surface Ag Non-reactive Non-reactive    Hep B C IgM Non-reactive Non-reactive    Hep A IgM Non-reactive Non-reactive    Hepatitis C Ab Non-reactive Non-reactive     Imaging Results    None        The emergency physician reviewed the vital signs / test results outlined above.     ED Discussion        Patient needs GI evaluation for consideration of PEG revision -- leaking around edges even with 24 Fr. She may also need a gastric emptying study due to leaking tube feeds >2 h post feed; no abdominal pain, high pitched BS, or signs of obstruction.     Patient's evaluation in the ED does not suggest any emergent or life-threatening medical conditions requiring immediate intervention beyond what was provided in the ED, and I believe patient is safe for discharge. Regardless, an unremarkable evaluation in the ED does not preclude the  development or presence of a serious or life-threatening condition. As such, patient was given return instructions for any change or worsening of symptoms.       ED Medication(s) Administered:  Medications - No data to display    Prescription Management: I performed a review of the patient's current Rx medication list as input by nursing staff.    Discharge Medication List as of 9/5/2024  9:55 PM        CONTINUE these medications which have NOT CHANGED    Details   alendronate (FOSAMAX) 70 MG tablet Take 70 mg by mouth every 7 days., Historical Med      amitriptyline (ELAVIL) 25 MG tablet TK 1 T PO HS, Historical Med      aspirin (ECOTRIN) 81 MG EC tablet Take 81 mg by mouth once daily., Historical Med      biotin 10 mg Tab Take 1 tablet by mouth., Historical Med      cholestyramine, with sugar, 4 gram Powd Take 4 g by mouth once daily., Starting Wed 12/11/2019, Until Thu 12/10/2020, Normal      cyclobenzaprine (FLEXERIL) 10 MG tablet Take 10 mg by mouth every evening., Historical Med      diphenhydrAMINE-acetaminophen (TYLENOL PM)  mg Tab Take 1 tablet by mouth nightly as needed., Historical Med      DULoxetine (CYMBALTA) 30 MG capsule Take 1 capsule (30 mg total) by mouth once daily., Starting Thu 2/18/2021, Until Fri 2/18/2022, Normal      famotidine (PEPCID) 40 MG tablet Take 1 tablet (40 mg total) by mouth once daily., Starting Mon 11/18/2019, Until Tue 11/17/2020, Normal      HYDROcodone-acetaminophen (NORCO)  mg per tablet TK 1 T PO QD PRN P, Historical Med      letrozole (FEMARA) 2.5 mg Tab Take 2.5 mg by mouth once daily., Historical Med      meloxicam (MOBIC) 15 MG tablet TK 1 T PO QD, Historical Med      omeprazole (PRILOSEC) 40 MG capsule Take 1 capsule by mouth., Starting Thu 4/12/2018, Historical Med      pravastatin (PRAVACHOL) 20 MG tablet Take 20 mg by mouth once daily., Starting Mon 6/3/2019, Historical Med      rifAXIMin (XIFAXAN) 550 mg Tab Take 1 tablet (550 mg total) by mouth 2  (two) times daily., Starting Mon 10/14/2019, Normal      rimegepant (NURTEC) ODT 75 mg DIS 1 T ON THE TONGUE Q 24 H PRF MIGRAINE HEADACHE. NTE 75 MG IN 24 H, Historical Med      rosuvastatin (CRESTOR) 10 MG tablet TK 1 T PO QD, Historical Med      sertraline (ZOLOFT) 50 MG tablet TAKE 1 2 (ONE HALF) TABLET BY MOUTH ONCE DAILY FOR 7 DAYS THEN INCREASE TO ONE TABLET ONCE DAILY THEREAFTER, Historical Med      sulfamethoxazole-trimethoprim 200-40 mg/5 ml (BACTRIM,SEPTRA) 200-40 mg/5 mL Susp 20 mLs by Per G Tube route every 12 (twelve) hours. for 7 days, Starting Sun 9/1/2024, Until Sun 9/8/2024, Normal               Follow-up Information       Olivia Ruth NP. Schedule an appointment as soon as possible for a visit in 1 day.    Specialty: Family Medicine  Contact information:  [address] [place] [id number] [phone]               with your GI doctor. Schedule an appointment as soon as possible for a visit in 1 day.               [facility] Emergency Dept.    Specialty: Emergency Medicine  Why: As needed, If symptoms worsen  Contact information:  [address] [place] [id number] [phone]                          Clinical Impression       ICD-10-CM ICD-9-CM   1. Leaking PEG tube  K94.23 536.42      ED Disposition Condition    Discharge Stable             Guanaco Bonilla MD  09/12/24 0159

## (undated) DEVICE — SYR SLIP TIP 5CC

## (undated) DEVICE — SOL IRRI STRL WATER 1000ML

## (undated) DEVICE — KIT ENDO CARRY ON PROCEDURE

## (undated) DEVICE — CANISTER SUCTION 3000CC

## (undated) DEVICE — KIT SAHARA DRAPE DRAW/LIFT

## (undated) DEVICE — GLOVE PROTEXIS PI SYN SURG 6.5